# Patient Record
Sex: FEMALE | Race: OTHER | NOT HISPANIC OR LATINO
[De-identification: names, ages, dates, MRNs, and addresses within clinical notes are randomized per-mention and may not be internally consistent; named-entity substitution may affect disease eponyms.]

---

## 2017-01-12 ENCOUNTER — APPOINTMENT (OUTPATIENT)
Dept: UROLOGY | Facility: CLINIC | Age: 32
End: 2017-01-12

## 2017-01-12 VITALS
BODY MASS INDEX: 27.23 KG/M2 | SYSTOLIC BLOOD PRESSURE: 120 MMHG | HEART RATE: 65 BPM | HEIGHT: 62 IN | DIASTOLIC BLOOD PRESSURE: 70 MMHG | WEIGHT: 148 LBS | OXYGEN SATURATION: 99 %

## 2017-01-16 ENCOUNTER — TRANSCRIPTION ENCOUNTER (OUTPATIENT)
Age: 32
End: 2017-01-16

## 2017-03-23 ENCOUNTER — APPOINTMENT (OUTPATIENT)
Dept: INTERNAL MEDICINE | Facility: CLINIC | Age: 32
End: 2017-03-23

## 2017-03-23 ENCOUNTER — OUTPATIENT (OUTPATIENT)
Dept: OUTPATIENT SERVICES | Facility: HOSPITAL | Age: 32
LOS: 1 days | End: 2017-03-23
Payer: COMMERCIAL

## 2017-03-23 VITALS
DIASTOLIC BLOOD PRESSURE: 75 MMHG | HEART RATE: 66 BPM | BODY MASS INDEX: 27.6 KG/M2 | HEIGHT: 62 IN | WEIGHT: 150 LBS | RESPIRATION RATE: 16 BRPM | SYSTOLIC BLOOD PRESSURE: 114 MMHG | TEMPERATURE: 97.9 F

## 2017-03-23 DIAGNOSIS — N28.1 CYST OF KIDNEY, ACQUIRED: ICD-10-CM

## 2017-03-23 DIAGNOSIS — Z82.3 FAMILY HISTORY OF STROKE: ICD-10-CM

## 2017-03-23 DIAGNOSIS — Z91.09 OTHER ALLERGY STATUS, OTHER THAN TO DRUGS AND BIOLOGICAL SUBSTANCES: ICD-10-CM

## 2017-03-23 DIAGNOSIS — Z80.1 FAMILY HISTORY OF MALIGNANT NEOPLASM OF TRACHEA, BRONCHUS AND LUNG: ICD-10-CM

## 2017-03-23 DIAGNOSIS — Z00.00 ENCOUNTER FOR GENERAL ADULT MEDICAL EXAMINATION WITHOUT ABNORMAL FINDINGS: ICD-10-CM

## 2017-03-23 DIAGNOSIS — R31.9 HEMATURIA, UNSPECIFIED: ICD-10-CM

## 2017-03-23 DIAGNOSIS — R80.9 PROTEINURIA, UNSPECIFIED: ICD-10-CM

## 2017-03-23 LAB
APPEARANCE UR: ABNORMAL
BACTERIA # UR AUTO: ABNORMAL
BILIRUB UR-MCNC: NEGATIVE — SIGNIFICANT CHANGE UP
COLOR SPEC: YELLOW — SIGNIFICANT CHANGE UP
DIFF PNL FLD: ABNORMAL
EPI CELLS # UR: 18 /HPF — HIGH (ref 0–5)
GLUCOSE UR QL: NEGATIVE MG/DL — SIGNIFICANT CHANGE UP
HYALINE CASTS # UR AUTO: 6 /LPF — SIGNIFICANT CHANGE UP (ref 0–7)
KETONES UR-MCNC: NEGATIVE — SIGNIFICANT CHANGE UP
LEUKOCYTE ESTERASE UR-ACNC: NEGATIVE — SIGNIFICANT CHANGE UP
NITRITE UR-MCNC: NEGATIVE — SIGNIFICANT CHANGE UP
PH UR: 6 — SIGNIFICANT CHANGE UP (ref 5–8)
PROT UR-MCNC: 300 MG/DL
RBC CASTS # UR COMP ASSIST: 60 /HPF — HIGH (ref 0–4)
SP GR SPEC: 1.02 — SIGNIFICANT CHANGE UP (ref 1.01–1.02)
UROBILINOGEN FLD QL: NEGATIVE MG/DL — SIGNIFICANT CHANGE UP
WBC UR QL: 5 /HPF — SIGNIFICANT CHANGE UP (ref 0–5)

## 2017-03-23 PROCEDURE — 80053 COMPREHEN METABOLIC PANEL: CPT

## 2017-03-23 PROCEDURE — 85027 COMPLETE CBC AUTOMATED: CPT

## 2017-03-23 PROCEDURE — 83036 HEMOGLOBIN GLYCOSYLATED A1C: CPT

## 2017-03-23 PROCEDURE — 82785 ASSAY OF IGE: CPT

## 2017-03-23 PROCEDURE — G0463: CPT

## 2017-03-23 PROCEDURE — 82306 VITAMIN D 25 HYDROXY: CPT

## 2017-03-23 PROCEDURE — 81001 URINALYSIS AUTO W/SCOPE: CPT

## 2017-03-23 PROCEDURE — 82043 UR ALBUMIN QUANTITATIVE: CPT

## 2017-03-23 PROCEDURE — 80061 LIPID PANEL: CPT

## 2017-03-23 PROCEDURE — 86003 ALLG SPEC IGE CRUDE XTRC EA: CPT

## 2017-03-24 LAB
24R-OH-CALCIDIOL SERPL-MCNC: 13.3 NG/ML — LOW (ref 30–100)
ALBUMIN SERPL ELPH-MCNC: 4.4 G/DL — SIGNIFICANT CHANGE UP (ref 3.3–5)
ALP SERPL-CCNC: 62 U/L — SIGNIFICANT CHANGE UP (ref 40–120)
ALT FLD-CCNC: 22 U/L — SIGNIFICANT CHANGE UP (ref 10–45)
ANION GAP SERPL CALC-SCNC: 15 MMOL/L — SIGNIFICANT CHANGE UP (ref 5–17)
ANISOCYTOSIS BLD QL: SIGNIFICANT CHANGE UP
AST SERPL-CCNC: 27 U/L — SIGNIFICANT CHANGE UP (ref 10–40)
BASOPHILS # BLD AUTO: 0.05 K/UL — SIGNIFICANT CHANGE UP (ref 0–0.2)
BASOPHILS NFR BLD AUTO: 0.5 % — SIGNIFICANT CHANGE UP (ref 0–2)
BILIRUB SERPL-MCNC: 0.8 MG/DL — SIGNIFICANT CHANGE UP (ref 0.2–1.2)
BUN SERPL-MCNC: 18 MG/DL — SIGNIFICANT CHANGE UP (ref 7–23)
CALCIUM SERPL-MCNC: 9.7 MG/DL — SIGNIFICANT CHANGE UP (ref 8.4–10.5)
CHLORIDE SERPL-SCNC: 104 MMOL/L — SIGNIFICANT CHANGE UP (ref 96–108)
CHOLEST SERPL-MCNC: 175 MG/DL — SIGNIFICANT CHANGE UP (ref 10–199)
CO2 SERPL-SCNC: 24 MMOL/L — SIGNIFICANT CHANGE UP (ref 22–31)
CREAT ?TM UR-MCNC: 152 MG/DL — SIGNIFICANT CHANGE UP
CREAT SERPL-MCNC: 0.86 MG/DL — SIGNIFICANT CHANGE UP (ref 0.5–1.3)
ELLIPTOCYTES BLD QL SMEAR: SLIGHT — SIGNIFICANT CHANGE UP
EOSINOPHIL # BLD AUTO: 0.17 K/UL — SIGNIFICANT CHANGE UP (ref 0–0.5)
EOSINOPHIL NFR BLD AUTO: 1.8 % — SIGNIFICANT CHANGE UP (ref 0–6)
GLUCOSE SERPL-MCNC: 90 MG/DL — SIGNIFICANT CHANGE UP (ref 70–99)
HBA1C BLD-MCNC: 5.3 % — SIGNIFICANT CHANGE UP (ref 4–5.6)
HCT VFR BLD CALC: 42.7 % — SIGNIFICANT CHANGE UP (ref 34.5–45)
HDLC SERPL-MCNC: 52 MG/DL — SIGNIFICANT CHANGE UP (ref 40–125)
HGB BLD-MCNC: 13.4 G/DL — SIGNIFICANT CHANGE UP (ref 11.5–15.5)
HYPOCHROMIA BLD QL: SLIGHT — SIGNIFICANT CHANGE UP
IMM GRANULOCYTES NFR BLD AUTO: 0.3 % — SIGNIFICANT CHANGE UP (ref 0–1.5)
LIPID PNL WITH DIRECT LDL SERPL: 98 MG/DL — SIGNIFICANT CHANGE UP
LYMPHOCYTES # BLD AUTO: 3.17 K/UL — SIGNIFICANT CHANGE UP (ref 1–3.3)
LYMPHOCYTES # BLD AUTO: 33.2 % — SIGNIFICANT CHANGE UP (ref 13–44)
MANUAL SMEAR VERIFICATION: SIGNIFICANT CHANGE UP
MCHC RBC-ENTMCNC: 19.6 PG — LOW (ref 27–34)
MCHC RBC-ENTMCNC: 31.4 GM/DL — LOW (ref 32–36)
MCV RBC AUTO: 62.6 FL — LOW (ref 80–100)
MICROALBUMIN UR-MCNC: 181.2 MG/DL — SIGNIFICANT CHANGE UP
MICROALBUMIN/CREAT UR-RTO: 1192 UG/MG — HIGH (ref 0–30)
MICROCYTES BLD QL: SLIGHT — SIGNIFICANT CHANGE UP
MONOCYTES # BLD AUTO: 0.55 K/UL — SIGNIFICANT CHANGE UP (ref 0–0.9)
MONOCYTES NFR BLD AUTO: 5.8 % — SIGNIFICANT CHANGE UP (ref 2–14)
NEUTROPHILS # BLD AUTO: 5.59 K/UL — SIGNIFICANT CHANGE UP (ref 1.8–7.4)
NEUTROPHILS NFR BLD AUTO: 58.4 % — SIGNIFICANT CHANGE UP (ref 43–77)
OVALOCYTES BLD QL SMEAR: SLIGHT — SIGNIFICANT CHANGE UP
PLAT MORPH BLD: NORMAL — SIGNIFICANT CHANGE UP
PLATELET # BLD AUTO: 283 K/UL — SIGNIFICANT CHANGE UP (ref 150–400)
POIKILOCYTOSIS BLD QL AUTO: SLIGHT — SIGNIFICANT CHANGE UP
POLYCHROMASIA BLD QL SMEAR: SLIGHT — SIGNIFICANT CHANGE UP
POTASSIUM SERPL-MCNC: 4.6 MMOL/L — SIGNIFICANT CHANGE UP (ref 3.5–5.3)
POTASSIUM SERPL-SCNC: 4.6 MMOL/L — SIGNIFICANT CHANGE UP (ref 3.5–5.3)
PROT SERPL-MCNC: 7.9 G/DL — SIGNIFICANT CHANGE UP (ref 6–8.3)
RBC # BLD: 6.82 M/UL — HIGH (ref 3.8–5.2)
RBC # FLD: 16.1 % — HIGH (ref 10.3–14.5)
RBC BLD AUTO: ABNORMAL
SODIUM SERPL-SCNC: 143 MMOL/L — SIGNIFICANT CHANGE UP (ref 135–145)
TOTAL CHOLESTEROL/HDL RATIO MEASUREMENT: 3.4 RATIO — SIGNIFICANT CHANGE UP (ref 3.3–7.1)
TRIGL SERPL-MCNC: 125 MG/DL — SIGNIFICANT CHANGE UP (ref 10–149)
WBC # BLD: 9.56 K/UL — SIGNIFICANT CHANGE UP (ref 3.8–10.5)
WBC # FLD AUTO: 9.56 K/UL — SIGNIFICANT CHANGE UP (ref 3.8–10.5)

## 2017-03-25 LAB
ALLERGEN - JUNIPER (RED CEDAR) CLASS: 0 — SIGNIFICANT CHANGE UP
ALLERGEN - JUNIPER (RED CEDAR) CONC: <0.1 KUA/L — SIGNIFICANT CHANGE UP
ALLERGEN - ML SYCAMORE, LONDON PLANE: 1.26 KUA/L — HIGH
ALLERGEN - MULBERRY: <0.1 KUA/L — SIGNIFICANT CHANGE UP
BERMUDA GRASS IGE QN: 0.14 KUA/L — SIGNIFICANT CHANGE UP
BERMUDA GRASS IGE QN: SIGNIFICANT CHANGE UP
BOXELDER/MAPLE CLASS: 0 — SIGNIFICANT CHANGE UP
CALIF WALNUT IGE QN: <0.1 KUA/L — SIGNIFICANT CHANGE UP
CAT DANDER IGE QN: 2.15 KUA/L — HIGH
CLADOSPORIUM IGE QN: 0 — SIGNIFICANT CHANGE UP
CLADOSPORIUM IGE QN: <0.1 KUA/L — SIGNIFICANT CHANGE UP
CMN PIGWEED IGE QN: <0.1 KUA/L — SIGNIFICANT CHANGE UP
COMMON RAGWEED IGE QN: <0.1 KUA/L — SIGNIFICANT CHANGE UP
COTTONWOOD IGE QN: <0.1 KUA/L — SIGNIFICANT CHANGE UP
D PTERONYSS IGE QN: 46.6 KUA/L — HIGH
DEPRECATED A ALTERNATA IGE RAST QL: <0.1 KUA/L — SIGNIFICANT CHANGE UP
DEPRECATED A FUMIGATUS IGE RAST QL: 0 — SIGNIFICANT CHANGE UP
DEPRECATED ALTERNARIA IGE RAST QL: 0 — SIGNIFICANT CHANGE UP
DEPRECATED CALIF WALNUT POLN IGE RAST QL: 0 — SIGNIFICANT CHANGE UP
DEPRECATED CAT DANDER IGE RAST QL: ABNORMAL
DEPRECATED COMMON PIGWEED IGE RAST QL: 0 — SIGNIFICANT CHANGE UP
DEPRECATED COMMON RAGWEED IGE RAST QL: 0 — SIGNIFICANT CHANGE UP
DEPRECATED COTTONWOOD IGE RAST QL: 0 — SIGNIFICANT CHANGE UP
DEPRECATED LONDON PLANE IGE RAST QL: ABNORMAL
DEPRECATED MUGWORT IGE RAST QL: 0 — SIGNIFICANT CHANGE UP
DEPRECATED P NOTATUM IGE RAST QL: 0 — SIGNIFICANT CHANGE UP
DEPRECATED ROACH IGE RAST QL: SIGNIFICANT CHANGE UP
DEPRECATED SHEEP SORREL IGE RAST QL: 0 — SIGNIFICANT CHANGE UP
DEPRECATED TIMOTHY IGE RAST QL: 0 — SIGNIFICANT CHANGE UP
DEPRECATED WHITE ASH IGE RAST QL: 0 — SIGNIFICANT CHANGE UP
DOG DANDER IGE QN: 0.94 KUA/L — HIGH
DOG DANDER IGE QN: ABNORMAL
DUST ALLERG MIX2 IGE RAST: ABNORMAL
GOOSEFOOT IGE QN: 0.15 KUA/L — SIGNIFICANT CHANGE UP
MOLD ALLERG MIX A IGE QL: <0.1 KUA/L — SIGNIFICANT CHANGE UP
MUGWORT IGE QN: <0.1 KUA/L — SIGNIFICANT CHANGE UP
MULBERRY CLASS: 0 — SIGNIFICANT CHANGE UP
P NOTATUM IGE QN: <0.1 KUA/L — SIGNIFICANT CHANGE UP
ROACH IGE QN: 0.27 KUA/L — SIGNIFICANT CHANGE UP
SHEEP SORREL IGE QN: <0.1 KUA/L — SIGNIFICANT CHANGE UP
SILVER BIRCH IGE QN: 0 — SIGNIFICANT CHANGE UP
SILVER BIRCH IGE QN: <0.1 KUA/L — SIGNIFICANT CHANGE UP
TIMOTHY IGE QN: <0.1 KUA/L — SIGNIFICANT CHANGE UP
TOTAL IGE SMQN RAST: 521 KU/L — HIGH
TOTAL IGE SMQN RAST: SIGNIFICANT CHANGE UP
TREE ALLERG MIX1 IGE QL: <0.1 KUA/L — SIGNIFICANT CHANGE UP
TREE ALLERG MIX1 IGE QN: <0.1 KUA/L — SIGNIFICANT CHANGE UP
TREE ALLERG MIX1 IGE RAST: 0 — SIGNIFICANT CHANGE UP
TREE ALLERG MIX1 IGE RAST: 0 — SIGNIFICANT CHANGE UP
TREE ALLERG MIX8 IGE QL: <0.1 KUA/L — SIGNIFICANT CHANGE UP
WEED ALLERG MIX1 IGE RAST: SIGNIFICANT CHANGE UP
WHITE ASH IGE QN: <0.1 KUA/L — SIGNIFICANT CHANGE UP

## 2017-03-29 LAB
D FARINAE IGE QN: 43 KUA/L — HIGH
DEPRECATED D FARINAE IGE RAST QL: ABNORMAL

## 2017-04-06 ENCOUNTER — APPOINTMENT (OUTPATIENT)
Dept: OPHTHALMOLOGY | Facility: CLINIC | Age: 32
End: 2017-04-06

## 2017-04-06 DIAGNOSIS — H40.003 PREGLAUCOMA, UNSPECIFIED, BILATERAL: ICD-10-CM

## 2017-08-04 ENCOUNTER — TRANSCRIPTION ENCOUNTER (OUTPATIENT)
Age: 32
End: 2017-08-04

## 2017-08-06 ENCOUNTER — TRANSCRIPTION ENCOUNTER (OUTPATIENT)
Age: 32
End: 2017-08-06

## 2018-04-04 ENCOUNTER — LABORATORY RESULT (OUTPATIENT)
Age: 33
End: 2018-04-04

## 2018-04-04 ENCOUNTER — APPOINTMENT (OUTPATIENT)
Dept: INTERNAL MEDICINE | Facility: CLINIC | Age: 33
End: 2018-04-04
Payer: COMMERCIAL

## 2018-04-04 VITALS
DIASTOLIC BLOOD PRESSURE: 78 MMHG | WEIGHT: 150 LBS | RESPIRATION RATE: 16 BRPM | SYSTOLIC BLOOD PRESSURE: 122 MMHG | TEMPERATURE: 98.3 F | HEIGHT: 62 IN | HEART RATE: 71 BPM | OXYGEN SATURATION: 98 % | BODY MASS INDEX: 27.6 KG/M2

## 2018-04-04 DIAGNOSIS — Z91.09 OTHER ALLERGY STATUS, OTHER THAN TO DRUGS AND BIOLOGICAL SUBSTANCES: ICD-10-CM

## 2018-04-04 DIAGNOSIS — R93.429 ABNORMAL RADIOLOGIC FINDINGS ON DIAGNOSITIC IMAGING OF UNSPECIFIED KIDNEY: ICD-10-CM

## 2018-04-04 PROCEDURE — 99395 PREV VISIT EST AGE 18-39: CPT

## 2018-04-05 LAB
ALBUMIN SERPL ELPH-MCNC: 4 G/DL
ALP BLD-CCNC: 66 U/L
ALT SERPL-CCNC: 19 U/L
ANION GAP SERPL CALC-SCNC: 12 MMOL/L
APPEARANCE: CLEAR
AST SERPL-CCNC: 17 U/L
BACTERIA: ABNORMAL
BASOPHILS # BLD AUTO: 0.03 K/UL
BASOPHILS NFR BLD AUTO: 0.3 %
BILIRUB SERPL-MCNC: 0.7 MG/DL
BILIRUBIN URINE: NEGATIVE
BLOOD URINE: ABNORMAL
BUN SERPL-MCNC: 18 MG/DL
CALCIUM SERPL-MCNC: 9.1 MG/DL
CHLORIDE SERPL-SCNC: 108 MMOL/L
CHOLEST SERPL-MCNC: 160 MG/DL
CHOLEST/HDLC SERPL: 3.6 RATIO
CO2 SERPL-SCNC: 23 MMOL/L
COLOR: YELLOW
CREAT SERPL-MCNC: 0.97 MG/DL
EOSINOPHIL # BLD AUTO: 0.16 K/UL
EOSINOPHIL NFR BLD AUTO: 1.8 %
ESTIMATED AVERAGE GLUCOSE: 103 MG/DL
FERRITIN SERPL-MCNC: 189 NG/ML
GLUCOSE QUALITATIVE U: NEGATIVE MG/DL
GLUCOSE SERPL-MCNC: 94 MG/DL
HBA1C MFR BLD HPLC: 5.2 %
HCT VFR BLD CALC: 38.8 %
HDLC SERPL-MCNC: 45 MG/DL
HGB BLD-MCNC: 12.8 G/DL
HYALINE CASTS: 1 /LPF
IMM GRANULOCYTES NFR BLD AUTO: 0.2 %
KETONES URINE: NEGATIVE
LDLC SERPL CALC-MCNC: 98 MG/DL
LEUKOCYTE ESTERASE URINE: NEGATIVE
LYMPHOCYTES # BLD AUTO: 2.72 K/UL
LYMPHOCYTES NFR BLD AUTO: 31.4 %
MAN DIFF?: NORMAL
MCHC RBC-ENTMCNC: 20.3 PG
MCHC RBC-ENTMCNC: 33 GM/DL
MCV RBC AUTO: 61.7 FL
MICROSCOPIC-UA: NORMAL
MONOCYTES # BLD AUTO: 0.6 K/UL
MONOCYTES NFR BLD AUTO: 6.9 %
NEUTROPHILS # BLD AUTO: 5.14 K/UL
NEUTROPHILS NFR BLD AUTO: 59.4 %
NITRITE URINE: NEGATIVE
PH URINE: 6
PLATELET # BLD AUTO: 323 K/UL
POTASSIUM SERPL-SCNC: 4.7 MMOL/L
PROT SERPL-MCNC: 6.8 G/DL
PROTEIN URINE: 100 MG/DL
RBC # BLD: 6.29 M/UL
RBC # FLD: 16.2 %
RED BLOOD CELLS URINE: 26 /HPF
SODIUM SERPL-SCNC: 143 MMOL/L
SPECIFIC GRAVITY URINE: 1.02
SQUAMOUS EPITHELIAL CELLS: 6 /HPF
TRIGL SERPL-MCNC: 85 MG/DL
TSH SERPL-ACNC: 3.01 UIU/ML
UROBILINOGEN URINE: NEGATIVE MG/DL
WBC # FLD AUTO: 8.67 K/UL
WHITE BLOOD CELLS URINE: 4 /HPF

## 2018-04-06 LAB
HGB A MFR BLD: 92.7 %
HGB A2 MFR BLD: 5.4 %
HGB F MFR BLD: 1.9 %
HGB FRACT BLD-IMP: NORMAL

## 2018-06-28 ENCOUNTER — RESULT REVIEW (OUTPATIENT)
Age: 33
End: 2018-06-28

## 2018-08-20 ENCOUNTER — APPOINTMENT (OUTPATIENT)
Dept: INTERNAL MEDICINE | Facility: CLINIC | Age: 33
End: 2018-08-20
Payer: COMMERCIAL

## 2018-08-20 ENCOUNTER — LABORATORY RESULT (OUTPATIENT)
Age: 33
End: 2018-08-20

## 2018-08-20 VITALS
OXYGEN SATURATION: 100 % | WEIGHT: 155 LBS | SYSTOLIC BLOOD PRESSURE: 114 MMHG | HEART RATE: 76 BPM | BODY MASS INDEX: 28.52 KG/M2 | DIASTOLIC BLOOD PRESSURE: 60 MMHG | HEIGHT: 62 IN | RESPIRATION RATE: 16 BRPM | TEMPERATURE: 98.5 F

## 2018-08-20 PROCEDURE — 99214 OFFICE O/P EST MOD 30 MIN: CPT

## 2018-08-20 NOTE — HISTORY OF PRESENT ILLNESS
[FreeTextEntry1] : RUPALI CULLEN is a 32 year-old female who presents for follow-up hypothyroidism during pregnancy\par  [de-identified] : 32F with TSH not at goal (2.5) during pregnancy.  Her Ob/Gyn started her on Levothyroxine 50 mcg daily approx 6 weeks ago after TSH screening.  She is 12 weeks pregnant.  She reports feeling tired, denies cold intolerance,constipation, dry skin, myalgia.  She denies chest pain, SOB, dizziness, weakness, palpitations, lightheadedness or syncope.  Denies FHx thyroid disorder. \par \par LABS REVIEWED \par TSH 6/28/18 = 4.96  --> 4.50 (7/12/18) --> started Levothyroxine 50 mcg daily\par \par ROS - Constitutional, Eyes, HEENT, Cardiovascular, Pulmonary, Gastrointestinal, Genitourinary, Musculoskeletal, Integumentary, Psych, Heme/Lymph, Endocrine are otherwise negative except as noted in HPI.\par \par GENERAL:  Well appearing, well nourished\par EYES:  Normal Conjunctiva\par EARS: normal hearing\par NOSE:  Normal mucosa, no discharge\par NECK: supple, no masses, thyroid not enlarged\par LUNGS: Clear to auscultation\par CV:  S1, S2 normal, regular rate and rhythm, no bilateral lower extremity edema\par ABD:  Soft, flat, non-tender, bowel sounds normoactive\par EXT: normal peripheral pulses, FROM without pain\par Neuro:  no focal deficits\par PSYCH: Oriented x 3, normal affect, mood

## 2018-08-21 LAB — TSH SERPL-ACNC: 0.99 UIU/ML

## 2018-09-24 ENCOUNTER — TRANSCRIPTION ENCOUNTER (OUTPATIENT)
Age: 33
End: 2018-09-24

## 2018-09-25 ENCOUNTER — MOBILE ON CALL (OUTPATIENT)
Age: 33
End: 2018-09-25

## 2018-09-25 ENCOUNTER — TRANSCRIPTION ENCOUNTER (OUTPATIENT)
Age: 33
End: 2018-09-25

## 2018-09-26 LAB
T4 FREE SERPL-MCNC: 1.1 NG/DL
TSH SERPL-ACNC: 1.4 UIU/ML

## 2018-10-17 ENCOUNTER — ASOB RESULT (OUTPATIENT)
Age: 33
End: 2018-10-17

## 2018-10-17 ENCOUNTER — APPOINTMENT (OUTPATIENT)
Dept: ANTEPARTUM | Facility: CLINIC | Age: 33
End: 2018-10-17
Payer: COMMERCIAL

## 2018-10-17 PROCEDURE — 76817 TRANSVAGINAL US OBSTETRIC: CPT

## 2018-10-17 PROCEDURE — 76811 OB US DETAILED SNGL FETUS: CPT

## 2018-10-24 ENCOUNTER — ASOB RESULT (OUTPATIENT)
Age: 33
End: 2018-10-24

## 2018-10-24 ENCOUNTER — APPOINTMENT (OUTPATIENT)
Dept: ANTEPARTUM | Facility: CLINIC | Age: 33
End: 2018-10-24
Payer: COMMERCIAL

## 2018-10-24 PROCEDURE — 76816 OB US FOLLOW-UP PER FETUS: CPT

## 2018-10-24 PROCEDURE — 76817 TRANSVAGINAL US OBSTETRIC: CPT

## 2018-10-31 ENCOUNTER — APPOINTMENT (OUTPATIENT)
Dept: ANTEPARTUM | Facility: CLINIC | Age: 33
End: 2018-10-31
Payer: COMMERCIAL

## 2018-10-31 ENCOUNTER — ASOB RESULT (OUTPATIENT)
Age: 33
End: 2018-10-31

## 2018-10-31 PROCEDURE — 76815 OB US LIMITED FETUS(S): CPT

## 2018-10-31 PROCEDURE — 76817 TRANSVAGINAL US OBSTETRIC: CPT

## 2018-11-13 ENCOUNTER — ASOB RESULT (OUTPATIENT)
Age: 33
End: 2018-11-13

## 2018-11-13 ENCOUNTER — APPOINTMENT (OUTPATIENT)
Dept: ANTEPARTUM | Facility: CLINIC | Age: 33
End: 2018-11-13
Payer: COMMERCIAL

## 2018-11-13 PROCEDURE — 76817 TRANSVAGINAL US OBSTETRIC: CPT

## 2018-11-13 PROCEDURE — 76816 OB US FOLLOW-UP PER FETUS: CPT

## 2019-02-12 ENCOUNTER — LABORATORY RESULT (OUTPATIENT)
Age: 34
End: 2019-02-12

## 2019-02-12 ENCOUNTER — APPOINTMENT (OUTPATIENT)
Dept: NEPHROLOGY | Facility: CLINIC | Age: 34
End: 2019-02-12
Payer: COMMERCIAL

## 2019-02-12 VITALS
BODY MASS INDEX: 30.83 KG/M2 | OXYGEN SATURATION: 98 % | HEIGHT: 62 IN | WEIGHT: 167.55 LBS | SYSTOLIC BLOOD PRESSURE: 127 MMHG | DIASTOLIC BLOOD PRESSURE: 67 MMHG | HEART RATE: 79 BPM

## 2019-02-12 VITALS — SYSTOLIC BLOOD PRESSURE: 118 MMHG | DIASTOLIC BLOOD PRESSURE: 80 MMHG

## 2019-02-12 LAB
APPEARANCE: ABNORMAL
BACTERIA: ABNORMAL
BILIRUBIN URINE: NEGATIVE
BLOOD URINE: ABNORMAL
COLOR: YELLOW
GLUCOSE QUALITATIVE U: NEGATIVE MG/DL
KETONES URINE: NEGATIVE
LEUKOCYTE ESTERASE URINE: ABNORMAL
MICROSCOPIC-UA: NORMAL
NITRITE URINE: NEGATIVE
PH URINE: 6.5
PROTEIN URINE: 100 MG/DL
RED BLOOD CELLS URINE: 8 /HPF
SPECIFIC GRAVITY URINE: 1.01
SQUAMOUS EPITHELIAL CELLS: 5 /HPF
UROBILINOGEN URINE: NEGATIVE MG/DL
WHITE BLOOD CELLS URINE: 58 /HPF

## 2019-02-12 PROCEDURE — 99214 OFFICE O/P EST MOD 30 MIN: CPT

## 2019-02-12 RX ORDER — PRENATAL WITH FERROUS FUM AND FOLIC ACID 3080; 920; 120; 400; 22; 1.84; 3; 20; 10; 1; 12; 200; 27; 25; 2 [IU]/1; [IU]/1; MG/1; [IU]/1; MG/1; MG/1; MG/1; MG/1; MG/1; MG/1; UG/1; MG/1; MG/1; MG/1; MG/1
27-1 TABLET ORAL
Refills: 0 | Status: ACTIVE | COMMUNITY
Start: 2019-02-12

## 2019-02-14 NOTE — PHYSICAL EXAM
[General Appearance - Alert] : alert [General Appearance - In No Acute Distress] : in no acute distress [General Appearance - Well Nourished] : well nourished [General Appearance - Well Developed] : well developed [Sclera] : the sclera and conjunctiva were normal [Outer Ear] : the ears and nose were normal in appearance [Neck Appearance] : the appearance of the neck was normal [Neck Cervical Mass (___cm)] : no neck mass was observed [Jugular Venous Distention Increased] : there was no jugular-venous distention [Respiration, Rhythm And Depth] : normal respiratory rhythm and effort [Auscultation Breath Sounds / Voice Sounds] : lungs were clear to auscultation bilaterally [Apical Impulse] : the apical impulse was normal [Heart Rate And Rhythm] : heart rate was normal and rhythm regular [Heart Sounds] : normal S1 and S2 [Heart Sounds Gallop] : no gallops [Murmurs] : no murmurs [Edema] : there was no peripheral edema [Bowel Sounds] : normal bowel sounds [Abdomen Soft] : soft [Abdomen Tenderness] : non-tender [No CVA Tenderness] : no ~M costovertebral angle tenderness [Abnormal Walk] : normal gait [Musculoskeletal - Swelling] : no joint swelling seen [Skin Color & Pigmentation] : normal skin color and pigmentation [Skin Turgor] : normal skin turgor [] : no rash [Cranial Nerves] : cranial nerves 2-12 were intact [Oriented To Time, Place, And Person] : oriented to person, place, and time [Impaired Insight] : insight and judgment were intact [Affect] : the affect was normal

## 2019-02-15 LAB
ALBUMIN SERPL ELPH-MCNC: 3.9 G/DL
ALBUMIN SERPL ELPH-MCNC: 3.9 G/DL
ALP BLD-CCNC: 100 U/L
ALT SERPL-CCNC: 12 U/L
ANA SER IF-ACNC: NEGATIVE
ANION GAP SERPL CALC-SCNC: 14 MMOL/L
AST SERPL-CCNC: 24 U/L
BASOPHILS # BLD AUTO: 0 K/UL
BASOPHILS NFR BLD AUTO: 0 %
BILIRUB DIRECT SERPL-MCNC: 0.1 MG/DL
BILIRUB INDIRECT SERPL-MCNC: 0.3 MG/DL
BILIRUB SERPL-MCNC: 0.4 MG/DL
BUN SERPL-MCNC: 13 MG/DL
C3 SERPL-MCNC: 155 MG/DL
C4 SERPL-MCNC: 38 MG/DL
CALCIUM SERPL-MCNC: 9.7 MG/DL
CHLORIDE SERPL-SCNC: 102 MMOL/L
CO2 SERPL-SCNC: 20 MMOL/L
CREAT SERPL-MCNC: 0.74 MG/DL
CREAT SPEC-SCNC: 33 MG/DL
CREAT/PROT UR: 3.1 RATIO
DEPRECATED KAPPA LC FREE/LAMBDA SER: 0.71 RATIO
DSDNA AB SER-ACNC: <12 IU/ML
EOSINOPHIL # BLD AUTO: 0 K/UL
EOSINOPHIL NFR BLD AUTO: 0 %
FERRITIN SERPL-MCNC: 159 NG/ML
GLUCOSE SERPL-MCNC: 60 MG/DL
HAPTOGLOB SERPL-MCNC: 38 MG/DL
HCT VFR BLD CALC: 38.2 %
HGB BLD-MCNC: 11.8 G/DL
IRON SATN MFR SERPL: 26 %
IRON SERPL-MCNC: 108 UG/DL
KAPPA LC CSF-MCNC: 2.3 MG/DL
KAPPA LC SERPL-MCNC: 1.64 MG/DL
LDH SERPL-CCNC: 270 U/L
LYMPHOCYTES # BLD AUTO: 1.37 K/UL
LYMPHOCYTES NFR BLD AUTO: 12.4 %
MAGNESIUM SERPL-MCNC: 1.9 MG/DL
MAN DIFF?: NORMAL
MCHC RBC-ENTMCNC: 20.9 PG
MCHC RBC-ENTMCNC: 30.9 GM/DL
MCV RBC AUTO: 67.7 FL
MONOCYTES # BLD AUTO: 1.17 K/UL
MONOCYTES NFR BLD AUTO: 10.6 %
NEUTROPHILS # BLD AUTO: 8.28 K/UL
NEUTROPHILS NFR BLD AUTO: 74.3 %
PHOSPHATE SERPL-MCNC: 3 MG/DL
PLATELET # BLD AUTO: 335 K/UL
POTASSIUM SERPL-SCNC: 4.1 MMOL/L
PROT SERPL-MCNC: 7.2 G/DL
PROT UR-MCNC: 102 MG/DL
RBC # BLD: 5.64 M/UL
RBC # FLD: 16 %
RPR SER-TITR: NORMAL
SODIUM SERPL-SCNC: 136 MMOL/L
TIBC SERPL-MCNC: 416 UG/DL
UIBC SERPL-MCNC: 308 UG/DL
URATE SERPL-MCNC: 7 MG/DL
WBC # FLD AUTO: 11.01 K/UL

## 2019-02-15 NOTE — ASSESSMENT
[FreeTextEntry1] : 37 weeks pregnant\par \par #likely underlying ckd/ GNhematuria/proteinuria\par -proteinuria 1/23/19 on a spot was 31.\par -24 hr proteinuria 1/27/19 2.2g, cr 0.85\par -spot prot/cr 2/19 3.1\par -proteinuria seems stable\par -monitor for now\par -d/w pt that I think this proteinuria is likley related to underlying kidney issue and not PEC but we have to monitor her BP, and monitor for PEC closely\par -may need renal biopsy post delivery\par -plan for sono\par -serology sent for autoimmune negative thus far; viral at ob negative\par \par #two complex cysts LUP 4x3.6cm and LLP 1.4cm; \par -repeat renal sono\par \par #Bp stable, normal at office visit- discussed with pt signs/symptoms of PEC/what to look out for; check PEc labs today\par #advised to check urine cx at OB office\par \par #cr elevated for pregnancy; 1/26/19 0.85; however now improved; monitor; prepregnancy creatinint 4/4/18 0.97

## 2019-02-15 NOTE — HISTORY OF PRESENT ILLNESS
[FreeTextEntry1] : H/o cysts in kidney -complex cysts, hematuria, proteinuria, seen by urology Jan 2017 for the complex cysts and was told to follow up cysts\par Followed by Nephrology in the past  when she was 18 years and had renal biopsy which she reports as normal, but can not obtain Biopsy results from [facility]. Subsequently she was followed by Dr Zamora September 2016. \par This is her first pregnancy.\par Denies any miscarriages or abortions\par LMP was May 2018; April labs cr 0.97, 100 prot on UA\par GEOVANY March 1st\par 37 weeks pregnant, followed by Dr Skye Condon [place]; sees her every week; will deliver at [facility]\par Around 35 weeks patient reports higher BP than usual in the offices at around 130s on an automatic cuff, and increased proteinuria\par States at work she checks BP and BP is 114s on a manual cuff\par jan 26 2019 cr 0.85\par 1/27 hb 10.8, plat 234\par 24hr 1/27 2.2g\par denies leg swelling, foamy urine, or gross hematuria\par all other ros negative\par \par \par

## 2019-02-18 ENCOUNTER — FORM ENCOUNTER (OUTPATIENT)
Age: 34
End: 2019-02-18

## 2019-02-19 ENCOUNTER — OUTPATIENT (OUTPATIENT)
Dept: OUTPATIENT SERVICES | Facility: HOSPITAL | Age: 34
LOS: 1 days | End: 2019-02-19
Payer: COMMERCIAL

## 2019-02-19 ENCOUNTER — APPOINTMENT (OUTPATIENT)
Dept: ULTRASOUND IMAGING | Facility: CLINIC | Age: 34
End: 2019-02-19
Payer: COMMERCIAL

## 2019-02-19 DIAGNOSIS — Z00.8 ENCOUNTER FOR OTHER GENERAL EXAMINATION: ICD-10-CM

## 2019-02-19 PROCEDURE — 76775 US EXAM ABDO BACK WALL LIM: CPT

## 2019-02-19 PROCEDURE — 76775 US EXAM ABDO BACK WALL LIM: CPT | Mod: 26

## 2019-02-23 LAB
BSA DERIVED: 1.73 M2
CREAT 24H UR-MCNC: 1.4 G/24 H

## 2019-02-26 LAB
ALBUMIN MFR SERPL ELPH: 53.3 %
ALBUMIN SERPL-MCNC: 3.8 G/DL
ALBUMIN/GLOB SERPL: 1.1 RATIO
ALPHA1 GLOB MFR SERPL ELPH: 7 %
ALPHA1 GLOB SERPL ELPH-MCNC: 0.5 G/DL
ALPHA2 GLOB MFR SERPL ELPH: 13.1 %
ALPHA2 GLOB SERPL ELPH-MCNC: 0.9 G/DL
B-GLOBULIN MFR SERPL ELPH: 12.7 %
B-GLOBULIN SERPL ELPH-MCNC: 0.9 G/DL
CREAT 24H UR-MCNC: 1.4 G/24 H
CREAT 24H UR-MCNC: 1.4 G/24 H
CREAT ?TM UR-MCNC: 58 MG/DL
CREAT ?TM UR-MCNC: 58 MG/DL
GAMMA GLOB FLD ELPH-MCNC: 1 G/DL
GAMMA GLOB MFR SERPL ELPH: 13.9 %
INTERPRETATION SERPL IEP-IMP: NORMAL
M PROTEIN SPEC IFE-MCNC: NORMAL
PHOSPHOLIPASE A2 RECEPTOR ELISA: <2 RU/ML
PHOSPHOLIPASE A2 RECEPTOR IFA: NEGATIVE
PROT 24H UR-MRATE: 137 MG/DL
PROT ?TM UR-MCNC: 24 HR
PROT ?TM UR-MCNC: 24 HR
PROT SERPL-MCNC: 7.2 G/DL
PROT SERPL-MCNC: 7.2 G/DL
PROT UR-MCNC: 3288 MG/24 H
SPECIMEN VOL 24H UR: 2400 ML
SPECIMEN VOL 24H UR: 2400 ML

## 2019-02-27 ENCOUNTER — APPOINTMENT (OUTPATIENT)
Dept: NEPHROLOGY | Facility: CLINIC | Age: 34
End: 2019-02-27

## 2019-03-01 ENCOUNTER — INPATIENT (INPATIENT)
Facility: HOSPITAL | Age: 34
LOS: 1 days | Discharge: HOME | End: 2019-03-03
Attending: OBSTETRICS & GYNECOLOGY | Admitting: OBSTETRICS & GYNECOLOGY
Payer: COMMERCIAL

## 2019-03-01 VITALS
HEIGHT: 62 IN | SYSTOLIC BLOOD PRESSURE: 127 MMHG | WEIGHT: 167.99 LBS | RESPIRATION RATE: 18 BRPM | OXYGEN SATURATION: 100 % | DIASTOLIC BLOOD PRESSURE: 82 MMHG | HEART RATE: 82 BPM

## 2019-03-01 DIAGNOSIS — Z3A.00 WEEKS OF GESTATION OF PREGNANCY NOT SPECIFIED: ICD-10-CM

## 2019-03-01 DIAGNOSIS — Z34.80 ENCOUNTER FOR SUPERVISION OF OTHER NORMAL PREGNANCY, UNSPECIFIED TRIMESTER: ICD-10-CM

## 2019-03-01 DIAGNOSIS — O26.899 OTHER SPECIFIED PREGNANCY RELATED CONDITIONS, UNSPECIFIED TRIMESTER: ICD-10-CM

## 2019-03-01 LAB
BASOPHILS # BLD AUTO: 0 K/UL — SIGNIFICANT CHANGE UP (ref 0–0.2)
BASOPHILS NFR BLD AUTO: 0.2 % — SIGNIFICANT CHANGE UP (ref 0–2)
BLD GP AB SCN SERPL QL: NEGATIVE — SIGNIFICANT CHANGE UP
EOSINOPHIL # BLD AUTO: 0.1 K/UL — SIGNIFICANT CHANGE UP (ref 0–0.5)
EOSINOPHIL NFR BLD AUTO: 0.5 % — SIGNIFICANT CHANGE UP (ref 0–6)
HCT VFR BLD CALC: 33.4 % — LOW (ref 34.5–45)
HGB BLD-MCNC: 11.9 G/DL — SIGNIFICANT CHANGE UP (ref 11.5–15.5)
LYMPHOCYTES # BLD AUTO: 13.6 % — SIGNIFICANT CHANGE UP (ref 13–44)
LYMPHOCYTES # BLD AUTO: 2.2 K/UL — SIGNIFICANT CHANGE UP (ref 1–3.3)
MCHC RBC-ENTMCNC: 22.5 PG — LOW (ref 27–34)
MCHC RBC-ENTMCNC: 35.5 GM/DL — SIGNIFICANT CHANGE UP (ref 32–36)
MCV RBC AUTO: 63.5 FL — LOW (ref 80–100)
MONOCYTES # BLD AUTO: 1 K/UL — HIGH (ref 0–0.9)
MONOCYTES NFR BLD AUTO: 6.5 % — SIGNIFICANT CHANGE UP (ref 2–14)
NEUTROPHILS # BLD AUTO: 12.6 K/UL — HIGH (ref 1.8–7.4)
NEUTROPHILS NFR BLD AUTO: 79.3 % — HIGH (ref 43–77)
PLATELET # BLD AUTO: 257 K/UL — SIGNIFICANT CHANGE UP (ref 150–400)
RBC # BLD: 5.26 M/UL — HIGH (ref 3.8–5.2)
RBC # FLD: 14.5 % — SIGNIFICANT CHANGE UP (ref 10.3–14.5)
RH IG SCN BLD-IMP: POSITIVE — SIGNIFICANT CHANGE UP
RH IG SCN BLD-IMP: POSITIVE — SIGNIFICANT CHANGE UP
WBC # BLD: 15.9 K/UL — HIGH (ref 3.8–10.5)
WBC # FLD AUTO: 15.9 K/UL — HIGH (ref 3.8–10.5)

## 2019-03-01 RX ORDER — SODIUM CHLORIDE 9 MG/ML
1000 INJECTION, SOLUTION INTRAVENOUS ONCE
Qty: 0 | Refills: 0 | Status: COMPLETED | OUTPATIENT
Start: 2019-03-01 | End: 2019-03-01

## 2019-03-01 RX ORDER — DIPHENHYDRAMINE HCL 50 MG
25 CAPSULE ORAL EVERY 6 HOURS
Qty: 0 | Refills: 0 | Status: DISCONTINUED | OUTPATIENT
Start: 2019-03-02 | End: 2019-03-03

## 2019-03-01 RX ORDER — GLYCERIN ADULT
1 SUPPOSITORY, RECTAL RECTAL AT BEDTIME
Qty: 0 | Refills: 0 | Status: DISCONTINUED | OUTPATIENT
Start: 2019-03-02 | End: 2019-03-03

## 2019-03-01 RX ORDER — PRAMOXINE HYDROCHLORIDE 150 MG/15G
1 AEROSOL, FOAM RECTAL EVERY 4 HOURS
Qty: 0 | Refills: 0 | Status: DISCONTINUED | OUTPATIENT
Start: 2019-03-02 | End: 2019-03-03

## 2019-03-01 RX ORDER — OXYCODONE HYDROCHLORIDE 5 MG/1
5 TABLET ORAL EVERY 4 HOURS
Qty: 0 | Refills: 0 | Status: DISCONTINUED | OUTPATIENT
Start: 2019-03-01 | End: 2019-03-03

## 2019-03-01 RX ORDER — SODIUM CHLORIDE 9 MG/ML
1000 INJECTION, SOLUTION INTRAVENOUS
Qty: 0 | Refills: 0 | Status: DISCONTINUED | OUTPATIENT
Start: 2019-03-01 | End: 2019-03-01

## 2019-03-01 RX ORDER — IBUPROFEN 200 MG
600 TABLET ORAL EVERY 6 HOURS
Qty: 0 | Refills: 0 | Status: COMPLETED | OUTPATIENT
Start: 2019-03-01 | End: 2020-01-28

## 2019-03-01 RX ORDER — SODIUM CHLORIDE 9 MG/ML
3 INJECTION INTRAMUSCULAR; INTRAVENOUS; SUBCUTANEOUS EVERY 8 HOURS
Qty: 0 | Refills: 0 | Status: DISCONTINUED | OUTPATIENT
Start: 2019-03-01 | End: 2019-03-01

## 2019-03-01 RX ORDER — OXYTOCIN 10 UNIT/ML
41.67 VIAL (ML) INJECTION
Qty: 20 | Refills: 0 | Status: DISCONTINUED | OUTPATIENT
Start: 2019-03-01 | End: 2019-03-01

## 2019-03-01 RX ORDER — OXYTOCIN 10 UNIT/ML
41.67 VIAL (ML) INJECTION
Qty: 20 | Refills: 0 | Status: DISCONTINUED | OUTPATIENT
Start: 2019-03-02 | End: 2019-03-03

## 2019-03-01 RX ORDER — AER TRAVELER 0.5 G/1
1 SOLUTION RECTAL; TOPICAL EVERY 4 HOURS
Qty: 0 | Refills: 0 | Status: DISCONTINUED | OUTPATIENT
Start: 2019-03-01 | End: 2019-03-01

## 2019-03-01 RX ORDER — OXYTOCIN 10 UNIT/ML
333.33 VIAL (ML) INJECTION
Qty: 20 | Refills: 0 | Status: DISCONTINUED | OUTPATIENT
Start: 2019-03-01 | End: 2019-03-01

## 2019-03-01 RX ORDER — DOCUSATE SODIUM 100 MG
100 CAPSULE ORAL
Qty: 0 | Refills: 0 | Status: DISCONTINUED | OUTPATIENT
Start: 2019-03-02 | End: 2019-03-03

## 2019-03-01 RX ORDER — DIBUCAINE 1 %
1 OINTMENT (GRAM) RECTAL EVERY 4 HOURS
Qty: 0 | Refills: 0 | Status: DISCONTINUED | OUTPATIENT
Start: 2019-03-02 | End: 2019-03-03

## 2019-03-01 RX ORDER — OXYTOCIN 10 UNIT/ML
2 VIAL (ML) INJECTION
Qty: 30 | Refills: 0 | Status: DISCONTINUED | OUTPATIENT
Start: 2019-03-01 | End: 2019-03-01

## 2019-03-01 RX ORDER — OXYCODONE HYDROCHLORIDE 5 MG/1
5 TABLET ORAL
Qty: 0 | Refills: 0 | Status: DISCONTINUED | OUTPATIENT
Start: 2019-03-01 | End: 2019-03-03

## 2019-03-01 RX ORDER — DIBUCAINE 1 %
1 OINTMENT (GRAM) RECTAL EVERY 4 HOURS
Qty: 0 | Refills: 0 | Status: DISCONTINUED | OUTPATIENT
Start: 2019-03-01 | End: 2019-03-01

## 2019-03-01 RX ORDER — LANOLIN
1 OINTMENT (GRAM) TOPICAL EVERY 6 HOURS
Qty: 0 | Refills: 0 | Status: DISCONTINUED | OUTPATIENT
Start: 2019-03-02 | End: 2019-03-03

## 2019-03-01 RX ORDER — SIMETHICONE 80 MG/1
80 TABLET, CHEWABLE ORAL EVERY 6 HOURS
Qty: 0 | Refills: 0 | Status: DISCONTINUED | OUTPATIENT
Start: 2019-03-02 | End: 2019-03-03

## 2019-03-01 RX ORDER — PRAMOXINE HYDROCHLORIDE 150 MG/15G
1 AEROSOL, FOAM RECTAL EVERY 4 HOURS
Qty: 0 | Refills: 0 | Status: DISCONTINUED | OUTPATIENT
Start: 2019-03-01 | End: 2019-03-01

## 2019-03-01 RX ORDER — LEVOTHYROXINE SODIUM 125 MCG
50 TABLET ORAL DAILY
Qty: 0 | Refills: 0 | Status: DISCONTINUED | OUTPATIENT
Start: 2019-03-02 | End: 2019-03-03

## 2019-03-01 RX ORDER — MAGNESIUM HYDROXIDE 400 MG/1
30 TABLET, CHEWABLE ORAL
Qty: 0 | Refills: 0 | Status: DISCONTINUED | OUTPATIENT
Start: 2019-03-02 | End: 2019-03-03

## 2019-03-01 RX ORDER — SODIUM CHLORIDE 9 MG/ML
3 INJECTION INTRAMUSCULAR; INTRAVENOUS; SUBCUTANEOUS EVERY 8 HOURS
Qty: 0 | Refills: 0 | Status: DISCONTINUED | OUTPATIENT
Start: 2019-03-02 | End: 2019-03-03

## 2019-03-01 RX ORDER — ACETAMINOPHEN 500 MG
975 TABLET ORAL EVERY 6 HOURS
Qty: 0 | Refills: 0 | Status: COMPLETED | OUTPATIENT
Start: 2019-03-01 | End: 2020-01-28

## 2019-03-01 RX ORDER — HYDROCORTISONE 1 %
1 OINTMENT (GRAM) TOPICAL EVERY 4 HOURS
Qty: 0 | Refills: 0 | Status: DISCONTINUED | OUTPATIENT
Start: 2019-03-01 | End: 2019-03-01

## 2019-03-01 RX ORDER — TETANUS TOXOID, REDUCED DIPHTHERIA TOXOID AND ACELLULAR PERTUSSIS VACCINE, ADSORBED 5; 2.5; 8; 8; 2.5 [IU]/.5ML; [IU]/.5ML; UG/.5ML; UG/.5ML; UG/.5ML
0.5 SUSPENSION INTRAMUSCULAR ONCE
Qty: 0 | Refills: 0 | Status: DISCONTINUED | OUTPATIENT
Start: 2019-03-02 | End: 2019-03-03

## 2019-03-01 RX ORDER — KETOROLAC TROMETHAMINE 30 MG/ML
30 SYRINGE (ML) INJECTION ONCE
Qty: 0 | Refills: 0 | Status: DISCONTINUED | OUTPATIENT
Start: 2019-03-01 | End: 2019-03-01

## 2019-03-01 RX ORDER — AER TRAVELER 0.5 G/1
1 SOLUTION RECTAL; TOPICAL EVERY 4 HOURS
Qty: 0 | Refills: 0 | Status: DISCONTINUED | OUTPATIENT
Start: 2019-03-02 | End: 2019-03-03

## 2019-03-01 RX ORDER — CITRIC ACID/SODIUM CITRATE 300-500 MG
15 SOLUTION, ORAL ORAL EVERY 4 HOURS
Qty: 0 | Refills: 0 | Status: DISCONTINUED | OUTPATIENT
Start: 2019-03-01 | End: 2019-03-01

## 2019-03-01 RX ORDER — HYDROCORTISONE 1 %
1 OINTMENT (GRAM) TOPICAL EVERY 4 HOURS
Qty: 0 | Refills: 0 | Status: DISCONTINUED | OUTPATIENT
Start: 2019-03-02 | End: 2019-03-03

## 2019-03-01 RX ADMIN — Medication 2 MILLIUNIT(S)/MIN: at 15:25

## 2019-03-01 RX ADMIN — SODIUM CHLORIDE 2000 MILLILITER(S): 9 INJECTION, SOLUTION INTRAVENOUS at 14:00

## 2019-03-01 RX ADMIN — Medication 30 MILLIGRAM(S): at 21:49

## 2019-03-01 NOTE — PRE-ANESTHESIA EVALUATION ADULT - NSANTHAGERD_ENT_A_CORE
Problem: Patient Care Overview  Goal: Plan of Care/Patient Progress Review  Outcome: Improving  Patient A&O. VSS on RA. Denies pain. Up SBA. BLE edema. Wound on shin, dressing CDI. WOC consulted for wound on buttocks. Tolerating low fat diet. Tele: SR with BBB. Will continue to monitor.        No

## 2019-03-01 NOTE — PRE-ANESTHESIA EVALUATION ADULT - NSANTHOSAYNRD_GEN_A_CORE
No. JOBY screening performed.  STOP BANG Legend: 0-2 = LOW Risk; 3-4 = INTERMEDIATE Risk; 5-8 = HIGH Risk

## 2019-03-01 NOTE — PATIENT PROFILE OB - BELONGINGS, PROFILE
cell phone/clothing/shoes tote/clothing/shoes/wallet/money (see comment)/cell phone/glasses/purse/credit cards/laptop

## 2019-03-02 ENCOUNTER — TRANSCRIPTION ENCOUNTER (OUTPATIENT)
Age: 34
End: 2019-03-02

## 2019-03-02 LAB
HCT VFR BLD CALC: 30.5 % — LOW (ref 34.5–45)
HGB BLD-MCNC: 9.5 G/DL — LOW (ref 11.5–15.5)

## 2019-03-02 RX ORDER — ACETAMINOPHEN 500 MG
975 TABLET ORAL EVERY 6 HOURS
Qty: 0 | Refills: 0 | Status: DISCONTINUED | OUTPATIENT
Start: 2019-03-02 | End: 2019-03-03

## 2019-03-02 RX ORDER — IBUPROFEN 200 MG
1 TABLET ORAL
Qty: 0 | Refills: 0 | COMMUNITY
Start: 2019-03-02

## 2019-03-02 RX ORDER — LEVOTHYROXINE SODIUM 125 MCG
1 TABLET ORAL
Qty: 0 | Refills: 0 | COMMUNITY
Start: 2019-03-02

## 2019-03-02 RX ORDER — IBUPROFEN 200 MG
600 TABLET ORAL EVERY 6 HOURS
Qty: 0 | Refills: 0 | Status: DISCONTINUED | OUTPATIENT
Start: 2019-03-02 | End: 2019-03-03

## 2019-03-02 RX ORDER — ACETAMINOPHEN 500 MG
3 TABLET ORAL
Qty: 0 | Refills: 0 | DISCHARGE
Start: 2019-03-02

## 2019-03-02 RX ADMIN — Medication 50 MICROGRAM(S): at 06:37

## 2019-03-02 RX ADMIN — SODIUM CHLORIDE 3 MILLILITER(S): 9 INJECTION INTRAMUSCULAR; INTRAVENOUS; SUBCUTANEOUS at 06:30

## 2019-03-02 RX ADMIN — Medication 1 TABLET(S): at 12:54

## 2019-03-02 NOTE — PROGRESS NOTE ADULT - SUBJECTIVE AND OBJECTIVE BOX
OB Progress Note:  PPD#1    S: 32yo  PPD#1 s/p . Patient feels well. Pain is well controlled. She is tolerating a regular diet and passing flatus. She is voiding spontaneously, and ambulating without difficulty. Denies CP/SOB. Denies lightheadedness/dizziness. Denies N/V.    O:  Vitals:  Vital Signs Last 24 Hrs  T(C): 37.1 (02 Mar 2019 00:20), Max: 37.3 (01 Mar 2019 21:05)  T(F): 98.8 (02 Mar 2019 00:20), Max: 99.1 (01 Mar 2019 21:05)  HR: 99 (02 Mar 2019 00:20) (82 - 115)  BP: 112/70 (02 Mar 2019 00:20) (112/70 - 138/60)  BP(mean): 88 (01 Mar 2019 22:50) (86 - 90)  RR: 18 (02 Mar 2019 00:20) (16 - 19)  SpO2: 99% (01 Mar 2019 23:05) (99% - 100%)    MEDICATIONS  (STANDING):  acetaminophen   Tablet .. 975 milliGRAM(s) Oral every 6 hours  diphtheria/tetanus/pertussis (acellular) Vaccine (ADAcel) 0.5 milliLiter(s) IntraMuscular once  ibuprofen  Tablet. 600 milliGRAM(s) Oral every 6 hours  levothyroxine 50 MICROGram(s) Oral daily  oxyCODONE    IR 5 milliGRAM(s) Oral every 3 hours  oxytocin Infusion 41.667 milliUNIT(s)/Min (125 mL/Hr) IV Continuous <Continuous>  prenatal multivitamin 1 Tablet(s) Oral daily  sodium chloride 0.9% lock flush 3 milliLiter(s) IV Push every 8 hours      Labs:  Blood type: O Positive  Rubella IgG: RPR:                           11.9   15.9<H> >-----------< 257    (  @ 14:12 )             33.4<L>            Physical Exam:  General: NAD  Abdomen: soft, non-tender, non-distended, fundus firm  Vaginal: Lochia wnl  Extremities: No erythema/edema

## 2019-03-02 NOTE — DISCHARGE NOTE OB - MATERIALS PROVIDED
[facility] Hearing Screen Program/Shaken Baby Prevention Handout/[facility]  Screening Program/Back To Sleep Handout/Breastfeeding Mother’s Support Group Information

## 2019-03-02 NOTE — DISCHARGE NOTE OB - HOSPITAL COURSE
Patient admitted in labor and had an uncomplicated vaginal delivery.  Patient had an unremarkable postpartum course and was stable for discharge home on postpartum day 2.

## 2019-03-02 NOTE — DISCHARGE NOTE OB - CARE PLAN
Principal Discharge DX:	Vaginal delivery  Goal:	Routine postpartum care  Assessment and plan of treatment:	Please call office for 6 week postpartum visit

## 2019-03-02 NOTE — DISCHARGE NOTE OB - PATIENT PORTAL LINK FT
You can access the [facility] Patient Portal, offered by [facility], by registering with the following website: [email or web address]

## 2019-03-02 NOTE — DISCHARGE NOTE OB - MEDICATION SUMMARY - MEDICATIONS TO TAKE
I will START or STAY ON the medications listed below when I get home from the hospital:    acetaminophen 325 mg oral tablet  -- 3 tab(s) by mouth every 6 hours  -- Indication: For pain    ibuprofen 600 mg oral tablet  -- 1 tab(s) by mouth every 6 hours  -- Indication: For pain    Prenatal Multivitamins with Folic Acid 1 mg oral tablet  -- 1 tab(s) by mouth once a day  -- Indication: For routine postpartum care    levothyroxine 50 mcg (0.05 mg) oral tablet  -- 1 tab(s) by mouth once a day  -- Indication: For hypothyroidism

## 2019-03-02 NOTE — PROGRESS NOTE ADULT - PROBLEM SELECTOR PLAN 1
- Pain well controlled, continue current pain regimen  - Increase ambulation, SCDs when not ambulating  - Continue regular diet    Blanka Salcedo PGY1

## 2019-03-03 VITALS
RESPIRATION RATE: 18 BRPM | TEMPERATURE: 98 F | DIASTOLIC BLOOD PRESSURE: 60 MMHG | HEART RATE: 76 BPM | SYSTOLIC BLOOD PRESSURE: 117 MMHG

## 2019-03-03 LAB — T PALLIDUM AB TITR SER: NEGATIVE — SIGNIFICANT CHANGE UP

## 2019-03-03 PROCEDURE — 59050 FETAL MONITOR W/REPORT: CPT

## 2019-03-03 PROCEDURE — 86901 BLOOD TYPING SEROLOGIC RH(D): CPT

## 2019-03-03 PROCEDURE — 90707 MMR VACCINE SC: CPT

## 2019-03-03 PROCEDURE — 86850 RBC ANTIBODY SCREEN: CPT

## 2019-03-03 PROCEDURE — 86900 BLOOD TYPING SEROLOGIC ABO: CPT

## 2019-03-03 PROCEDURE — 85018 HEMOGLOBIN: CPT

## 2019-03-03 PROCEDURE — 85014 HEMATOCRIT: CPT

## 2019-03-03 PROCEDURE — G0463: CPT

## 2019-03-03 PROCEDURE — 85027 COMPLETE CBC AUTOMATED: CPT

## 2019-03-03 PROCEDURE — 86780 TREPONEMA PALLIDUM: CPT

## 2019-03-03 PROCEDURE — 59025 FETAL NON-STRESS TEST: CPT

## 2019-03-03 RX ADMIN — Medication 0.5 MILLILITER(S): at 10:19

## 2019-03-03 RX ADMIN — Medication 50 MICROGRAM(S): at 05:42

## 2019-03-03 NOTE — PROGRESS NOTE ADULT - ASSESSMENT
PPD2 s/p , doing well   -Discharge home   -Precautions given   -Follow-up in office in 6 weeks      Juli Erickson MD

## 2019-03-03 NOTE — PROGRESS NOTE ADULT - SUBJECTIVE AND OBJECTIVE BOX
OB Attending Note    S: Patient doing well. Minimal lochia. Pain controlled.    O: Vital Signs Last 24 Hrs  T(C): 36.9 (03 Mar 2019 06:32), Max: 36.9 (03 Mar 2019 06:32)  T(F): 98.4 (03 Mar 2019 06:32), Max: 98.4 (03 Mar 2019 06:32)  HR: 76 (03 Mar 2019 06:32) (76 - 93)  BP: 117/60 (03 Mar 2019 06:32) (117/60 - 124/82)  BP(mean): --  RR: 18 (03 Mar 2019 06:32) (18 - 18)  SpO2: --    Gen: NAD  Abd: soft, NT, ND, fundus firm below umbilicus  Lochia: min  Perineum healing well  Ext: no tenderness    Labs:                        9.5    x     )-----------( x        ( 02 Mar 2019 10:12 )             30.5

## 2019-03-05 ENCOUNTER — OTHER (OUTPATIENT)
Age: 34
End: 2019-03-05

## 2019-04-12 ENCOUNTER — RESULT REVIEW (OUTPATIENT)
Age: 34
End: 2019-04-12

## 2019-04-19 ENCOUNTER — TRANSCRIPTION ENCOUNTER (OUTPATIENT)
Age: 34
End: 2019-04-19

## 2019-05-29 ENCOUNTER — APPOINTMENT (OUTPATIENT)
Dept: NEPHROLOGY | Facility: CLINIC | Age: 34
End: 2019-05-29
Payer: COMMERCIAL

## 2019-05-29 VITALS
SYSTOLIC BLOOD PRESSURE: 122 MMHG | HEIGHT: 62 IN | HEART RATE: 65 BPM | BODY MASS INDEX: 27.97 KG/M2 | OXYGEN SATURATION: 99 % | DIASTOLIC BLOOD PRESSURE: 57 MMHG | WEIGHT: 152 LBS

## 2019-05-29 VITALS — SYSTOLIC BLOOD PRESSURE: 110 MMHG | DIASTOLIC BLOOD PRESSURE: 60 MMHG

## 2019-05-29 PROCEDURE — 99214 OFFICE O/P EST MOD 30 MIN: CPT

## 2019-05-29 NOTE — ASSESSMENT
[FreeTextEntry1] : 33 year old 3 mos post partum; seen during pregnancy for worsening of her proteinuria during pregnancy;\par \par #Proteinuria/hematuria- ?etiology/likely underlying renal disease/GN; recheck ua, prot/cr today; may need renal biopsy; will decide based on labs and urine results today; pt can not get results from past\par \par #complicated renal cyst- advised to follow up with Urology \par \par #BP stable now; uptrended slightly during pregnancy\par \par #check renal panel\par \par #pt not taking nsaids, asa, or herbal or omega 3; advised not to

## 2019-05-29 NOTE — HISTORY OF PRESENT ILLNESS
[FreeTextEntry1] : Delivered at 40weeks via vaginal delivery, Baby almost 3 months now; \par Denies leg swelling\par no sob\par no urinary symptoms\par no cloudy or foamy urine\par no gross hematuria\par all ros negative \par not breastfeeding\par off synthroid as tsh back to normal\par all ros negative\par \par \par

## 2019-05-29 NOTE — PHYSICAL EXAM
[General Appearance - Alert] : alert [General Appearance - In No Acute Distress] : in no acute distress [General Appearance - Well Nourished] : well nourished [General Appearance - Well Developed] : well developed [Outer Ear] : the ears and nose were normal in appearance [Sclera] : the sclera and conjunctiva were normal [Neck Cervical Mass (___cm)] : no neck mass was observed [Neck Appearance] : the appearance of the neck was normal [Jugular Venous Distention Increased] : there was no jugular-venous distention [Respiration, Rhythm And Depth] : normal respiratory rhythm and effort [Auscultation Breath Sounds / Voice Sounds] : lungs were clear to auscultation bilaterally [Apical Impulse] : the apical impulse was normal [Heart Rate And Rhythm] : heart rate was normal and rhythm regular [Heart Sounds Gallop] : no gallops [Heart Sounds] : normal S1 and S2 [Edema] : there was no peripheral edema [Murmurs] : no murmurs [Abdomen Soft] : soft [Bowel Sounds] : normal bowel sounds [Abdomen Tenderness] : non-tender [No CVA Tenderness] : no ~M costovertebral angle tenderness [Abnormal Walk] : normal gait [Musculoskeletal - Swelling] : no joint swelling seen [Skin Color & Pigmentation] : normal skin color and pigmentation [Skin Turgor] : normal skin turgor [] : no rash [Cranial Nerves] : cranial nerves 2-12 were intact [Oriented To Time, Place, And Person] : oriented to person, place, and time [Affect] : the affect was normal [Impaired Insight] : insight and judgment were intact

## 2019-06-03 LAB
ALBUMIN SERPL ELPH-MCNC: 3.9 G/DL
ANION GAP SERPL CALC-SCNC: 13 MMOL/L
APPEARANCE: CLEAR
APTT BLD: 37.3 SEC
BACTERIA UR CULT: NORMAL
BACTERIA: NEGATIVE
BILIRUBIN URINE: NEGATIVE
BLOOD URINE: ABNORMAL
BUN SERPL-MCNC: 17 MG/DL
CALCIUM SERPL-MCNC: 9.1 MG/DL
CHLORIDE SERPL-SCNC: 105 MMOL/L
CO2 SERPL-SCNC: 25 MMOL/L
COLOR: NORMAL
CREAT SERPL-MCNC: 0.96 MG/DL
CREAT SPEC-SCNC: 82 MG/DL
CREAT/PROT UR: 2.8 RATIO
GLUCOSE QUALITATIVE U: NEGATIVE
GLUCOSE SERPL-MCNC: 73 MG/DL
HYALINE CASTS: 0 /LPF
INR PPP: 0.96 RATIO
KETONES URINE: NEGATIVE
LEUKOCYTE ESTERASE URINE: NEGATIVE
MICROSCOPIC-UA: NORMAL
NITRITE URINE: NEGATIVE
PH URINE: 6.5
PHOSPHATE SERPL-MCNC: 3.4 MG/DL
PHOSPHOLIPASE A2 RECEPTOR ELISA: 2 RU/ML
PHOSPHOLIPASE A2 RECEPTOR IFA: NEGATIVE
POTASSIUM SERPL-SCNC: 4 MMOL/L
PROT UR-MCNC: 224 MG/DL
PROTEIN URINE: ABNORMAL
PT BLD: 10.9 SEC
RED BLOOD CELLS URINE: 26 /HPF
SODIUM SERPL-SCNC: 143 MMOL/L
SPECIFIC GRAVITY URINE: 1.02
SQUAMOUS EPITHELIAL CELLS: 2 /HPF
URINE COMMENTS: NORMAL
UROBILINOGEN URINE: NORMAL
WHITE BLOOD CELLS URINE: 2 /HPF

## 2019-06-19 ENCOUNTER — APPOINTMENT (OUTPATIENT)
Age: 34
End: 2019-06-19
Payer: COMMERCIAL

## 2019-06-19 VITALS
HEART RATE: 62 BPM | SYSTOLIC BLOOD PRESSURE: 120 MMHG | TEMPERATURE: 97.9 F | WEIGHT: 152 LBS | BODY MASS INDEX: 27.8 KG/M2 | OXYGEN SATURATION: 100 % | DIASTOLIC BLOOD PRESSURE: 78 MMHG | RESPIRATION RATE: 17 BRPM

## 2019-06-19 PROCEDURE — 99213 OFFICE O/P EST LOW 20 MIN: CPT

## 2019-06-19 NOTE — HISTORY OF PRESENT ILLNESS
[FreeTextEntry1] : 34 yo F with longstanding history of complex renal cyst\par Last seen by Dr. Gage in jan, 2017\par Currently being worked up by nephrology for proteinuria/hematuria and pending possible renal biopsy\par Recent Renal US showed complex renal cyst in the left kidney which has slightly increased in size\par Denies any symptoms including flank pain, gross hematuria, recurrent UTI

## 2019-06-19 NOTE — PHYSICAL EXAM
[General Appearance - Well Developed] : well developed [General Appearance - Well Nourished] : well nourished [Normal Appearance] : normal appearance [Well Groomed] : well groomed [General Appearance - In No Acute Distress] : no acute distress [Edema] : no peripheral edema [] : no respiratory distress [Respiration, Rhythm And Depth] : normal respiratory rhythm and effort [Exaggerated Use Of Accessory Muscles For Inspiration] : no accessory muscle use [Oriented To Time, Place, And Person] : oriented to person, place, and time [Affect] : the affect was normal [Mood] : the mood was normal [Normal Station and Gait] : the gait and station were normal for the patient's age [Not Anxious] : not anxious [No Focal Deficits] : no focal deficits [No Palpable Adenopathy] : no palpable adenopathy

## 2019-06-19 NOTE — ASSESSMENT
[FreeTextEntry1] : 32 yo F with complex left renal cyst\par \par - Reviewed imaging done in 2016 from [facility] as well as recent renal US\par - Discussed pros and cons of abdominal MRI. Pt eager to proceed.\par - FU after MRI

## 2019-06-21 LAB
BUN SERPL-MCNC: 19 MG/DL
CREAT SERPL-MCNC: 0.93 MG/DL

## 2020-04-26 ENCOUNTER — MESSAGE (OUTPATIENT)
Age: 35
End: 2020-04-26

## 2020-05-28 LAB
SARS-COV-2 IGG SERPL IA-ACNC: <0.1 INDEX
SARS-COV-2 IGG SERPL QL IA: NEGATIVE

## 2020-09-02 ENCOUNTER — RESULT REVIEW (OUTPATIENT)
Age: 35
End: 2020-09-02

## 2020-10-08 ENCOUNTER — APPOINTMENT (OUTPATIENT)
Dept: ANTEPARTUM | Facility: CLINIC | Age: 35
End: 2020-10-08
Payer: COMMERCIAL

## 2020-10-08 ENCOUNTER — ASOB RESULT (OUTPATIENT)
Age: 35
End: 2020-10-08

## 2020-10-08 PROCEDURE — 99201 OFFICE OUTPATIENT NEW 10 MINUTES: CPT | Mod: 25

## 2020-10-08 PROCEDURE — 76801 OB US < 14 WKS SINGLE FETUS: CPT

## 2020-11-25 ENCOUNTER — ASOB RESULT (OUTPATIENT)
Age: 35
End: 2020-11-25

## 2020-11-25 ENCOUNTER — APPOINTMENT (OUTPATIENT)
Dept: ANTEPARTUM | Facility: CLINIC | Age: 35
End: 2020-11-25
Payer: COMMERCIAL

## 2020-11-25 PROCEDURE — 76811 OB US DETAILED SNGL FETUS: CPT

## 2020-11-25 PROCEDURE — 99072 ADDL SUPL MATRL&STAF TM PHE: CPT

## 2021-01-11 ENCOUNTER — ASOB RESULT (OUTPATIENT)
Age: 36
End: 2021-01-11

## 2021-01-11 ENCOUNTER — APPOINTMENT (OUTPATIENT)
Dept: MATERNAL FETAL MEDICINE | Facility: CLINIC | Age: 36
End: 2021-01-11
Payer: COMMERCIAL

## 2021-01-11 PROCEDURE — 99205 OFFICE O/P NEW HI 60 MIN: CPT | Mod: 95

## 2021-01-13 ENCOUNTER — ASOB RESULT (OUTPATIENT)
Age: 36
End: 2021-01-13

## 2021-01-13 ENCOUNTER — APPOINTMENT (OUTPATIENT)
Dept: ANTEPARTUM | Facility: CLINIC | Age: 36
End: 2021-01-13
Payer: COMMERCIAL

## 2021-01-13 PROCEDURE — 99072 ADDL SUPL MATRL&STAF TM PHE: CPT

## 2021-01-13 PROCEDURE — 76816 OB US FOLLOW-UP PER FETUS: CPT

## 2021-01-26 ENCOUNTER — NON-APPOINTMENT (OUTPATIENT)
Age: 36
End: 2021-01-26

## 2021-01-26 ENCOUNTER — APPOINTMENT (OUTPATIENT)
Dept: NEPHROLOGY | Facility: CLINIC | Age: 36
End: 2021-01-26
Payer: COMMERCIAL

## 2021-01-26 PROCEDURE — 99214 OFFICE O/P EST MOD 30 MIN: CPT | Mod: 95

## 2021-01-26 NOTE — ASSESSMENT
[FreeTextEntry1] : 35 year old  h/o proteinuria/hematuria noted during first pregnancy in 2019 and now pregnant again 29 weeks pregnant\par \par #Proteinuria/hematuria- renal cysts as well ?polycystic; etiology likely underlying renal disease ?cystic/ chronicGN;  patient had deferred renal biopsy in the past and then lost to follow up since May 2019; Labs reviewed with patient from 12/25 at OB visit: hb 9.2, 24 hr protein 2g\par Labs from the past during 2019 during pregnancy proteinuria had peaked 3.1 then 2.8 in May 2019 3 mos post partum; do not have any proteinuria quantification between that.  \par will check ua, prot/cr\par \par #complicated renal cyst- advised to follow up with Urology; will repeat renal sono for cysts once she is post partum \par \par #Anemia -on slow Fe daily per pt started by OB; hb 9.2 in December\par \par #BP stable now per patient; uptrended slightly during last pregnancy; advised how to monitor BP \par \par #risk of PEC- advised her to speak to OB today about baby ASA; counseled on signs/sx of PE\par \par #advised that if chronic GN a renal biosy is indicated bc the longer she goes with proteinuria/untreated kidney disease it can eventually lead to scarring and renal function loss\par \par #check renal panel/PEC labs/ua. prot/cr this week\par \par \par  \par \par \par \par \par \par \par  \par \par \par \par \par \par  \par

## 2021-02-08 ENCOUNTER — APPOINTMENT (OUTPATIENT)
Dept: MATERNAL FETAL MEDICINE | Facility: CLINIC | Age: 36
End: 2021-02-08

## 2021-02-10 ENCOUNTER — APPOINTMENT (OUTPATIENT)
Dept: ANTEPARTUM | Facility: CLINIC | Age: 36
End: 2021-02-10
Payer: COMMERCIAL

## 2021-02-10 ENCOUNTER — ASOB RESULT (OUTPATIENT)
Age: 36
End: 2021-02-10

## 2021-02-10 PROCEDURE — 76816 OB US FOLLOW-UP PER FETUS: CPT

## 2021-02-10 PROCEDURE — 99213 OFFICE O/P EST LOW 20 MIN: CPT | Mod: 25

## 2021-02-10 PROCEDURE — 99072 ADDL SUPL MATRL&STAF TM PHE: CPT

## 2021-03-09 ENCOUNTER — OUTPATIENT (OUTPATIENT)
Dept: OUTPATIENT SERVICES | Facility: HOSPITAL | Age: 36
LOS: 1 days | Discharge: HOME | End: 2021-03-09

## 2021-03-09 DIAGNOSIS — D64.9 ANEMIA, UNSPECIFIED: ICD-10-CM

## 2021-03-10 ENCOUNTER — RESULT REVIEW (OUTPATIENT)
Age: 36
End: 2021-03-10

## 2021-03-10 ENCOUNTER — APPOINTMENT (OUTPATIENT)
Dept: HEMATOLOGY ONCOLOGY | Facility: CLINIC | Age: 36
End: 2021-03-10
Payer: COMMERCIAL

## 2021-03-10 ENCOUNTER — ASOB RESULT (OUTPATIENT)
Age: 36
End: 2021-03-10

## 2021-03-10 ENCOUNTER — APPOINTMENT (OUTPATIENT)
Dept: ANTEPARTUM | Facility: CLINIC | Age: 36
End: 2021-03-10
Payer: COMMERCIAL

## 2021-03-10 VITALS
DIASTOLIC BLOOD PRESSURE: 72 MMHG | BODY MASS INDEX: 31.4 KG/M2 | RESPIRATION RATE: 18 BRPM | WEIGHT: 170.63 LBS | OXYGEN SATURATION: 99 % | TEMPERATURE: 97.3 F | SYSTOLIC BLOOD PRESSURE: 115 MMHG | HEIGHT: 62 IN | HEART RATE: 97 BPM

## 2021-03-10 DIAGNOSIS — E61.1 IRON DEFICIENCY: ICD-10-CM

## 2021-03-10 DIAGNOSIS — D64.9 ANEMIA, UNSPECIFIED: ICD-10-CM

## 2021-03-10 DIAGNOSIS — D50.9 IRON DEFICIENCY ANEMIA, UNSPECIFIED: ICD-10-CM

## 2021-03-10 LAB
BASOPHILS # BLD AUTO: 0.03 K/UL — SIGNIFICANT CHANGE UP (ref 0–0.2)
BASOPHILS NFR BLD AUTO: 0.3 % — SIGNIFICANT CHANGE UP (ref 0–2)
EOSINOPHIL # BLD AUTO: 0.23 K/UL — SIGNIFICANT CHANGE UP (ref 0–0.5)
EOSINOPHIL NFR BLD AUTO: 2.3 % — SIGNIFICANT CHANGE UP (ref 0–6)
HCT VFR BLD CALC: 32.1 % — LOW (ref 34.5–45)
HGB BLD-MCNC: 10 G/DL — LOW (ref 11.5–15.5)
IMM GRANULOCYTES NFR BLD AUTO: 2.2 % — HIGH (ref 0–1.5)
LYMPHOCYTES # BLD AUTO: 1.51 K/UL — SIGNIFICANT CHANGE UP (ref 1–3.3)
LYMPHOCYTES # BLD AUTO: 15.1 % — SIGNIFICANT CHANGE UP (ref 13–44)
MCHC RBC-ENTMCNC: 21 PG — LOW (ref 27–34)
MCHC RBC-ENTMCNC: 31.2 G/DL — LOW (ref 32–36)
MCV RBC AUTO: 67.4 FL — LOW (ref 80–100)
MONOCYTES # BLD AUTO: 0.68 K/UL — SIGNIFICANT CHANGE UP (ref 0–0.9)
MONOCYTES NFR BLD AUTO: 6.8 % — SIGNIFICANT CHANGE UP (ref 2–14)
NEUTROPHILS # BLD AUTO: 7.32 K/UL — SIGNIFICANT CHANGE UP (ref 1.8–7.4)
NEUTROPHILS NFR BLD AUTO: 73.3 % — SIGNIFICANT CHANGE UP (ref 43–77)
NRBC # BLD: 0 /100 WBCS — SIGNIFICANT CHANGE UP (ref 0–0)
PLATELET # BLD AUTO: 248 K/UL — SIGNIFICANT CHANGE UP (ref 150–400)
RBC # BLD: 4.76 M/UL — SIGNIFICANT CHANGE UP (ref 3.8–5.2)
RBC # FLD: 16.5 % — HIGH (ref 10.3–14.5)
WBC # BLD: 9.99 K/UL — SIGNIFICANT CHANGE UP (ref 3.8–10.5)
WBC # FLD AUTO: 9.99 K/UL — SIGNIFICANT CHANGE UP (ref 3.8–10.5)

## 2021-03-10 PROCEDURE — 76816 OB US FOLLOW-UP PER FETUS: CPT

## 2021-03-10 PROCEDURE — 99072 ADDL SUPL MATRL&STAF TM PHE: CPT

## 2021-03-10 PROCEDURE — 99205 OFFICE O/P NEW HI 60 MIN: CPT

## 2021-03-10 NOTE — HISTORY OF PRESENT ILLNESS
[de-identified] : 35 year-old Ms. CULLEN is seen in consult for iron deficiency anemia during pregnancy. She is currently at 36 weeks and is expecting her baby in 4/14/2021. This is her second pregnancy. The patient did not need any iron supplement in her first pregnancy. She never had a blood transfusion. She is currently on oral iron supplement and is tolerating well. She is not agreeable for IV iron. Of note, the patients suffers microscopic hematuria.

## 2021-03-10 NOTE — ASSESSMENT
[FreeTextEntry1] : 35 year-old Ms. CULLEN is seen in consult for JOSH. She is 36 week pregnant with her second baby. Her CBC is suggestive of thalassemia. May have concurrent iron deficiency. Could not locate a iron panel or ferritin in the labs. She has been on PO iron, tolerating well. Patient is not agreeable for IV iron. Of note, patient has microscopic hematuria. Will proceed with the followings:  \par \par # JOSH in the setting of Pregnancy 36 wks\par - CBC, CMP, Iron panel, Ferritin, B12, Folate, TSH today \par - Hgb Electrophoresis, if normal -- alpha thal gene study \par - Patient is on po iron \par - Declined IV iron \par - COntinue PO iron \par

## 2021-03-12 RX ORDER — CYANOCOBALAMIN 1000 UG/ML
1000 INJECTION INTRAMUSCULAR; SUBCUTANEOUS
Qty: 0 | Refills: 0 | Status: COMPLETED | OUTPATIENT
Start: 2021-03-12

## 2021-03-13 LAB
ALBUMIN SERPL ELPH-MCNC: 3.4 G/DL
ALP BLD-CCNC: 83 U/L
ALT SERPL-CCNC: 7 U/L
ANION GAP SERPL CALC-SCNC: 9 MMOL/L
AST SERPL-CCNC: 16 U/L
BILIRUB SERPL-MCNC: 0.3 MG/DL
BUN SERPL-MCNC: 11 MG/DL
CALCIUM SERPL-MCNC: 9.2 MG/DL
CHLORIDE SERPL-SCNC: 105 MMOL/L
CO2 SERPL-SCNC: 23 MMOL/L
CREAT SERPL-MCNC: 0.83 MG/DL
FERRITIN SERPL-MCNC: 140 NG/ML
FOLATE SERPL-MCNC: 12.7 NG/ML
GLUCOSE SERPL-MCNC: 90 MG/DL
HGB A MFR BLD: 92.9 %
HGB A2 MFR BLD: 5 %
HGB F MFR BLD: 2.1 %
HGB FRACT BLD-IMP: NORMAL
IRON SATN MFR SERPL: 23 %
IRON SERPL-MCNC: 86 UG/DL
POTASSIUM SERPL-SCNC: 4.2 MMOL/L
PROT SERPL-MCNC: 5.8 G/DL
SODIUM SERPL-SCNC: 137 MMOL/L
TIBC SERPL-MCNC: 379 UG/DL
TSH SERPL-ACNC: 1.83 UIU/ML
UIBC SERPL-MCNC: 293 UG/DL
VIT B12 SERPL-MCNC: 216 PG/ML

## 2021-03-17 ENCOUNTER — APPOINTMENT (OUTPATIENT)
Dept: INFUSION THERAPY | Facility: HOSPITAL | Age: 36
End: 2021-03-17

## 2021-03-22 LAB — ALPHA - GLOBIN COMMON MUTATION RESULT: NORMAL

## 2021-03-25 ENCOUNTER — APPOINTMENT (OUTPATIENT)
Dept: INFUSION THERAPY | Facility: HOSPITAL | Age: 36
End: 2021-03-25

## 2021-03-29 RX ORDER — CYANOCOBALAMIN 1000 UG/ML
1000 INJECTION INTRAMUSCULAR; SUBCUTANEOUS
Qty: 0 | Refills: 0 | Status: COMPLETED | OUTPATIENT
Start: 2021-03-29

## 2021-04-01 ENCOUNTER — APPOINTMENT (OUTPATIENT)
Dept: ANTEPARTUM | Facility: CLINIC | Age: 36
End: 2021-04-01
Payer: COMMERCIAL

## 2021-04-01 ENCOUNTER — ASOB RESULT (OUTPATIENT)
Age: 36
End: 2021-04-01

## 2021-04-01 ENCOUNTER — APPOINTMENT (OUTPATIENT)
Dept: INFUSION THERAPY | Facility: HOSPITAL | Age: 36
End: 2021-04-01

## 2021-04-01 PROCEDURE — 99072 ADDL SUPL MATRL&STAF TM PHE: CPT

## 2021-04-01 PROCEDURE — 76816 OB US FOLLOW-UP PER FETUS: CPT

## 2021-04-05 ENCOUNTER — OUTPATIENT (OUTPATIENT)
Dept: OUTPATIENT SERVICES | Facility: HOSPITAL | Age: 36
LOS: 1 days | End: 2021-04-05
Payer: COMMERCIAL

## 2021-04-05 DIAGNOSIS — Z11.52 ENCOUNTER FOR SCREENING FOR COVID-19: ICD-10-CM

## 2021-04-05 PROCEDURE — U0005: CPT

## 2021-04-05 PROCEDURE — C9803: CPT

## 2021-04-05 PROCEDURE — U0003: CPT

## 2021-04-06 LAB — SARS-COV-2 RNA SPEC QL NAA+PROBE: SIGNIFICANT CHANGE UP

## 2021-04-07 ENCOUNTER — INPATIENT (INPATIENT)
Facility: HOSPITAL | Age: 36
LOS: 1 days | Discharge: HOME | End: 2021-04-09
Attending: OBSTETRICS & GYNECOLOGY | Admitting: OBSTETRICS & GYNECOLOGY
Payer: COMMERCIAL

## 2021-04-07 VITALS
HEIGHT: 62 IN | WEIGHT: 169.98 LBS | DIASTOLIC BLOOD PRESSURE: 71 MMHG | TEMPERATURE: 98 F | OXYGEN SATURATION: 98 % | SYSTOLIC BLOOD PRESSURE: 130 MMHG | RESPIRATION RATE: 18 BRPM | HEART RATE: 82 BPM

## 2021-04-07 RX ORDER — SODIUM CHLORIDE 9 MG/ML
1000 INJECTION, SOLUTION INTRAVENOUS
Refills: 0 | Status: DISCONTINUED | OUTPATIENT
Start: 2021-04-07 | End: 2021-04-08

## 2021-04-07 RX ORDER — CITRIC ACID/SODIUM CITRATE 300-500 MG
15 SOLUTION, ORAL ORAL EVERY 6 HOURS
Refills: 0 | Status: DISCONTINUED | OUTPATIENT
Start: 2021-04-07 | End: 2021-04-08

## 2021-04-07 RX ORDER — OXYTOCIN 10 UNIT/ML
333.33 VIAL (ML) INJECTION
Qty: 20 | Refills: 0 | Status: COMPLETED | OUTPATIENT
Start: 2021-04-07 | End: 2021-04-08

## 2021-04-07 RX ADMIN — SODIUM CHLORIDE 125 MILLILITER(S): 9 INJECTION, SOLUTION INTRAVENOUS at 23:30

## 2021-04-07 NOTE — OB PROVIDER H&P - NSFIRSTLIVEBIRTH_OBGYN_ALL_OB_DT
07-Apr-2019 [de-identified] : Patient presents for CPE. He reports that is he is recovering from a URI.  [FreeTextEntry1] : Patient presents for CPE.

## 2021-04-07 NOTE — OB PROVIDER H&P - ATTENDING COMMENTS
PT IS A P1 ADMITTED WITH H/O POLYCYSTIC KIDNEYS FOR IOL AS PER MFM.  I CAME TO SEE HER AT 0615. PT S/P CYTOTEC X 3 & CF. PT HAD SROM AS I ARRIVED- CLEAR FLUID. CVX 4.5-5/80/-2. PT CURRENTLY ON PITOCIN. CTXS IRREG. EPI IN PLACE. PT HAD FAST ACTIVE PHASE & SHORT 2ND STAGE LAST TIME.    KARLA MARK

## 2021-04-07 NOTE — OB RN PATIENT PROFILE - ABORTIONS, OB PROFILE
[facility]    Date: 2018  Patient Name: Kevan Zacarias        MRN: [id number]    Account #: [de-identified]  : 2008  (8 y.o.)  Gender: female     REASON FOR MISSED TREATMENT:    []Cancelled due to illness. [] Therapist Canceled Appointment  []Cancelled due to other appointment   []No Show / No call. Pt's guardian called with next scheduled appointment. [] Cancelled due to transportation conflict  []Cancelled due to weather  []Frequency of order changed  []Patient on hold due to:   [] Excused absence d/t at least 48 hour notice of cancellation  []Cancel /less than 48 hour notice.     [x]OTHER:  Cx pt has dentist appt    Electronically signed by:      Madina Diallo M.S., CCC/SLP            Date:2018
0

## 2021-04-07 NOTE — OB PROVIDER H&P - NSHPPHYSICALEXAM_GEN_ALL_CORE
Vital Signs Last 24 Hrs:    T(C): 36.8 (07 Apr 2021 21:13), Max: 36.8 (07 Apr 2021 21:00)  T(F): 98.24 (07 Apr 2021 21:13), Max: 98.24 (07 Apr 2021 21:13)  HR: 89 (07 Apr 2021 22:22) (78 - 89)  BP: 130/71 (07 Apr 2021 21:13) (130/71 - 130/71)  RR: 18 (07 Apr 2021 21:00) (18 - 18)  SpO2: 96% (07 Apr 2021 22:22) (96% - 99%)    Gen: NAD  CV: RRR  Pulm: CTAB  Abd: soft, non-tender, gravid  VE: 1.5/50/-3  Bedside sono: Vertex  EFM: 125/mod kimani/+Accels/-decels  [place]: q1-2m

## 2021-04-07 NOTE — OB PROVIDER H&P - ASSESSMENT
A/P: 36yo  @ 39w0d here for rrIOL with kidney cyst. COVID neg  -Admit to L&D  -Routine admission labs  -EFM/toco: resuscitative measures   -PO cytotec as IOL agent  -CB @12a  -Anesthesia consult for epidural (call Cafaro)  -Anticipate

## 2021-04-07 NOTE — OB RN PATIENT PROFILE - TOBACCO USE
Nursing Note by Eufemia Cárdenas RN at 06/09/17 08:41 AM     Author:  Eufemia Cárdenas RN Service:  (none) Author Type:  Registered Nurse     Filed:  06/09/17 08:42 AM Encounter Date:  6/9/2017 Status:  Signed     :  Eufemia Cárdenas RN (Registered Nurse)            Patient brought to exam room, vital signs obtained.[AG1.1T]  EKG obtained and results given to Dr. Barnhart.[AG1.1M]      Carla Laird was offered treatment for pain control:[AG1.1T] No.[AG1.1M]       Last visit with SAMANTHA BARNHART was on 02/24/2017 at 12:45 PM [facility]  [facility] with [facility] on 02/24/2017 at 12:45 PM in [facility]  Match done based on reference date of today 6/9/17    Electronically Signed by:    Eufemia Cárdenas RN , 6/9/2017[AG1.1T]                  Revision History        User Key Date/Time User Provider Type Action    > AG1.1 06/09/17 08:42 AM Eufemia Cárdenas RN Registered Nurse Sign    M - Manual, T - Template             Never smoker

## 2021-04-07 NOTE — OB PROVIDER H&P - HISTORY OF PRESENT ILLNESS
Pt. is a 34yo  @ 39w0d here for IOL 2/2 kidney disease (kidney cyst). Pt. denies LOF, VB, painful, regular ctx. Endorses +FM. PNC uncomplicated. GBS (-) EFW 3100g    Obhx: 2019: , 6#3  Gynhx: denies fibroids, ovarian cyst, endometriosis, abnormal pap smears, STIs  Pmedhx: kidney cyst   Surghx: Tonsillectomy  Meds: PNV  Allergies: NKDA  Psychhx: denies depression/anxiety  Sochx: denies ETOH, tobacco, illicit drug use

## 2021-04-08 ENCOUNTER — APPOINTMENT (OUTPATIENT)
Dept: INFUSION THERAPY | Facility: HOSPITAL | Age: 36
End: 2021-04-08

## 2021-04-08 LAB
BLD GP AB SCN SERPL QL: NEGATIVE — SIGNIFICANT CHANGE UP
COVID-19 SPIKE DOMAIN AB INTERP: NEGATIVE — SIGNIFICANT CHANGE UP
COVID-19 SPIKE DOMAIN ANTIBODY RESULT: 0.4 U/ML — SIGNIFICANT CHANGE UP
HCT VFR BLD CALC: 32.9 % — LOW (ref 34.5–45)
HGB BLD-MCNC: 9.9 G/DL — LOW (ref 11.5–15.5)
MCHC RBC-ENTMCNC: 20.6 PG — LOW (ref 27–34)
MCHC RBC-ENTMCNC: 30.1 GM/DL — LOW (ref 32–36)
MCV RBC AUTO: 68.5 FL — LOW (ref 80–100)
NRBC # BLD: 0 /100 WBCS — SIGNIFICANT CHANGE UP (ref 0–0)
PLATELET # BLD AUTO: 234 K/UL — SIGNIFICANT CHANGE UP (ref 150–400)
RBC # BLD: 4.8 M/UL — SIGNIFICANT CHANGE UP (ref 3.8–5.2)
RBC # FLD: 15.2 % — HIGH (ref 10.3–14.5)
RH IG SCN BLD-IMP: POSITIVE — SIGNIFICANT CHANGE UP
SARS-COV-2 IGG+IGM SERPL QL IA: 0.4 U/ML — SIGNIFICANT CHANGE UP
SARS-COV-2 IGG+IGM SERPL QL IA: NEGATIVE — SIGNIFICANT CHANGE UP
T PALLIDUM AB TITR SER: NEGATIVE — SIGNIFICANT CHANGE UP
WBC # BLD: 10.53 K/UL — HIGH (ref 3.8–10.5)
WBC # FLD AUTO: 10.53 K/UL — HIGH (ref 3.8–10.5)

## 2021-04-08 RX ORDER — DIPHENHYDRAMINE HCL 50 MG
25 CAPSULE ORAL EVERY 6 HOURS
Refills: 0 | Status: DISCONTINUED | OUTPATIENT
Start: 2021-04-08 | End: 2021-04-09

## 2021-04-08 RX ORDER — OXYCODONE HYDROCHLORIDE 5 MG/1
5 TABLET ORAL ONCE
Refills: 0 | Status: DISCONTINUED | OUTPATIENT
Start: 2021-04-08 | End: 2021-04-09

## 2021-04-08 RX ORDER — TETANUS TOXOID, REDUCED DIPHTHERIA TOXOID AND ACELLULAR PERTUSSIS VACCINE, ADSORBED 5; 2.5; 8; 8; 2.5 [IU]/.5ML; [IU]/.5ML; UG/.5ML; UG/.5ML; UG/.5ML
0.5 SUSPENSION INTRAMUSCULAR ONCE
Refills: 0 | Status: DISCONTINUED | OUTPATIENT
Start: 2021-04-08 | End: 2021-04-09

## 2021-04-08 RX ORDER — AER TRAVELER 0.5 G/1
1 SOLUTION RECTAL; TOPICAL EVERY 4 HOURS
Refills: 0 | Status: DISCONTINUED | OUTPATIENT
Start: 2021-04-08 | End: 2021-04-09

## 2021-04-08 RX ORDER — LANOLIN
1 OINTMENT (GRAM) TOPICAL EVERY 6 HOURS
Refills: 0 | Status: DISCONTINUED | OUTPATIENT
Start: 2021-04-08 | End: 2021-04-09

## 2021-04-08 RX ORDER — SIMETHICONE 80 MG/1
80 TABLET, CHEWABLE ORAL EVERY 4 HOURS
Refills: 0 | Status: DISCONTINUED | OUTPATIENT
Start: 2021-04-08 | End: 2021-04-09

## 2021-04-08 RX ORDER — KETOROLAC TROMETHAMINE 30 MG/ML
30 SYRINGE (ML) INJECTION ONCE
Refills: 0 | Status: DISCONTINUED | OUTPATIENT
Start: 2021-04-08 | End: 2021-04-08

## 2021-04-08 RX ORDER — BENZOCAINE 10 %
1 GEL (GRAM) MUCOUS MEMBRANE EVERY 6 HOURS
Refills: 0 | Status: DISCONTINUED | OUTPATIENT
Start: 2021-04-08 | End: 2021-04-09

## 2021-04-08 RX ORDER — ACETAMINOPHEN 500 MG
975 TABLET ORAL
Refills: 0 | Status: DISCONTINUED | OUTPATIENT
Start: 2021-04-08 | End: 2021-04-09

## 2021-04-08 RX ORDER — OXYCODONE HYDROCHLORIDE 5 MG/1
5 TABLET ORAL
Refills: 0 | Status: DISCONTINUED | OUTPATIENT
Start: 2021-04-08 | End: 2021-04-09

## 2021-04-08 RX ORDER — SODIUM CHLORIDE 9 MG/ML
3 INJECTION INTRAMUSCULAR; INTRAVENOUS; SUBCUTANEOUS EVERY 8 HOURS
Refills: 0 | Status: DISCONTINUED | OUTPATIENT
Start: 2021-04-08 | End: 2021-04-09

## 2021-04-08 RX ORDER — MAGNESIUM HYDROXIDE 400 MG/1
30 TABLET, CHEWABLE ORAL
Refills: 0 | Status: DISCONTINUED | OUTPATIENT
Start: 2021-04-08 | End: 2021-04-09

## 2021-04-08 RX ORDER — PRAMOXINE HYDROCHLORIDE 150 MG/15G
1 AEROSOL, FOAM RECTAL EVERY 4 HOURS
Refills: 0 | Status: DISCONTINUED | OUTPATIENT
Start: 2021-04-08 | End: 2021-04-09

## 2021-04-08 RX ORDER — IBUPROFEN 200 MG
600 TABLET ORAL EVERY 6 HOURS
Refills: 0 | Status: DISCONTINUED | OUTPATIENT
Start: 2021-04-08 | End: 2021-04-09

## 2021-04-08 RX ORDER — HYDROCORTISONE 1 %
1 OINTMENT (GRAM) TOPICAL EVERY 6 HOURS
Refills: 0 | Status: DISCONTINUED | OUTPATIENT
Start: 2021-04-08 | End: 2021-04-09

## 2021-04-08 RX ORDER — OXYTOCIN 10 UNIT/ML
4 VIAL (ML) INJECTION
Qty: 30 | Refills: 0 | Status: DISCONTINUED | OUTPATIENT
Start: 2021-04-08 | End: 2021-04-09

## 2021-04-08 RX ORDER — SODIUM CHLORIDE 9 MG/ML
1000 INJECTION INTRAMUSCULAR; INTRAVENOUS; SUBCUTANEOUS
Refills: 0 | Status: DISCONTINUED | OUTPATIENT
Start: 2021-04-08 | End: 2021-04-09

## 2021-04-08 RX ORDER — IBUPROFEN 200 MG
600 TABLET ORAL EVERY 6 HOURS
Refills: 0 | Status: COMPLETED | OUTPATIENT
Start: 2021-04-08 | End: 2022-03-07

## 2021-04-08 RX ORDER — SODIUM CHLORIDE 9 MG/ML
500 INJECTION INTRAMUSCULAR; INTRAVENOUS; SUBCUTANEOUS ONCE
Refills: 0 | Status: COMPLETED | OUTPATIENT
Start: 2021-04-08 | End: 2021-04-08

## 2021-04-08 RX ORDER — DIBUCAINE 1 %
1 OINTMENT (GRAM) RECTAL EVERY 6 HOURS
Refills: 0 | Status: DISCONTINUED | OUTPATIENT
Start: 2021-04-08 | End: 2021-04-09

## 2021-04-08 RX ORDER — OXYTOCIN 10 UNIT/ML
333.33 VIAL (ML) INJECTION
Qty: 20 | Refills: 0 | Status: DISCONTINUED | OUTPATIENT
Start: 2021-04-08 | End: 2021-04-09

## 2021-04-08 RX ADMIN — Medication 4 MILLIUNIT(S)/MIN: at 06:05

## 2021-04-08 RX ADMIN — Medication 975 MILLIGRAM(S): at 21:46

## 2021-04-08 RX ADMIN — SODIUM CHLORIDE 125 MILLILITER(S): 9 INJECTION, SOLUTION INTRAVENOUS at 07:56

## 2021-04-08 RX ADMIN — SODIUM CHLORIDE 1000 MILLILITER(S): 9 INJECTION INTRAMUSCULAR; INTRAVENOUS; SUBCUTANEOUS at 07:27

## 2021-04-08 RX ADMIN — Medication 1 TABLET(S): at 14:13

## 2021-04-08 RX ADMIN — Medication 975 MILLIGRAM(S): at 21:07

## 2021-04-08 RX ADMIN — Medication 975 MILLIGRAM(S): at 15:19

## 2021-04-08 RX ADMIN — Medication 600 MILLIGRAM(S): at 17:24

## 2021-04-08 RX ADMIN — Medication 975 MILLIGRAM(S): at 16:05

## 2021-04-08 RX ADMIN — Medication 30 MILLIGRAM(S): at 11:31

## 2021-04-08 RX ADMIN — Medication 600 MILLIGRAM(S): at 23:49

## 2021-04-08 RX ADMIN — Medication 1000 MILLIUNIT(S)/MIN: at 09:22

## 2021-04-08 NOTE — OB RN DELIVERY SUMMARY - NSSELHIDDEN_OBGYN_ALL_OB_FT
[NS_DeliveryAttending1_OBGYN_ALL_OB_FT:[id number]==],[NS_CirculateRN2_OBGYN_ALL_OB_FT:[id number]==]

## 2021-04-08 NOTE — OB PROVIDER LABOR PROGRESS NOTE - ASSESSMENT
progressing well.  will try to reduce anterior lip to expedite delivery.
category 2 tracing  pitocin off  iupc placed for amnioinfusion  
A/P:  -EFM/[place]  -For pitocin  D/w Dr. Sommer Nicholas PA-C
A/P:  -EFM/toco: resuscitative measures  -Continue PO cytotec  -Anesthesia consult for epidural (VE 1st)  -Anticipate

## 2021-04-08 NOTE — OB RN DELIVERY SUMMARY - NS_SEPSISRSKCALC_OBGYN_ALL_OB_FT
EOS calculated successfully. EOS Risk Factor: 0.5/1000 live births ([facility] national incidence); GA=39w1d; Temp=98.24; ROM=2.983; GBS='Negative'; Antibiotics='No antibiotics or any antibiotics < 2 hrs prior to birth'

## 2021-04-08 NOTE — OB PROVIDER LABOR PROGRESS NOTE - NS_SUBJECTIVE/OBJECTIVE_OBGYN_ALL_OB_FT
Pt. seen for cervical antonio balloon placement; pt comfortable at this time; cervical antonio balloon placed under sterile techniques; 60cc NS instilled into uterine/vaginal balloons; pt tolerated procedure well
PA Note:  Patient seen and evaluated at bedside.     Cervical balloon found to be in vagina and removed.
cramping
feeling pressure

## 2021-04-08 NOTE — OB PROVIDER DELIVERY SUMMARY - NSPROVIDERDELIVERYNOTE_OBGYN_ALL_OB_FT
Delivery Note:    Patient progressed to fd and had a short second stage (3 or 4 contractions).   was performed with epidural anesthesia without episiotomy.   viable female was delivered luda position, can x 2 tight.  cord was clamped and cut and the delivery proceeded easily.  cord blood and cord gases to lab.  placenta delivered spontaneously and uterine exploration was wnl.  cervix and vagina intact.  In addition to iv pitocin in the bag of fluid, cytotec 1000 was given per rectum with good result.  small second degree laceration was repaired in the usual manner with 00/000 rapide.  All counts were correct after delivery.  Patient tolerated all procedures well.

## 2021-04-09 ENCOUNTER — TRANSCRIPTION ENCOUNTER (OUTPATIENT)
Age: 36
End: 2021-04-09

## 2021-04-09 VITALS
HEART RATE: 72 BPM | DIASTOLIC BLOOD PRESSURE: 72 MMHG | SYSTOLIC BLOOD PRESSURE: 123 MMHG | OXYGEN SATURATION: 99 % | TEMPERATURE: 98 F | RESPIRATION RATE: 17 BRPM

## 2021-04-09 DIAGNOSIS — N28.1 CYST OF KIDNEY, ACQUIRED: ICD-10-CM

## 2021-04-09 PROCEDURE — 85027 COMPLETE CBC AUTOMATED: CPT

## 2021-04-09 PROCEDURE — 86900 BLOOD TYPING SEROLOGIC ABO: CPT

## 2021-04-09 PROCEDURE — 59050 FETAL MONITOR W/REPORT: CPT

## 2021-04-09 PROCEDURE — 86780 TREPONEMA PALLIDUM: CPT

## 2021-04-09 PROCEDURE — 90707 MMR VACCINE SC: CPT

## 2021-04-09 PROCEDURE — 86850 RBC ANTIBODY SCREEN: CPT

## 2021-04-09 PROCEDURE — 59025 FETAL NON-STRESS TEST: CPT

## 2021-04-09 PROCEDURE — 86901 BLOOD TYPING SEROLOGIC RH(D): CPT

## 2021-04-09 PROCEDURE — 86769 SARS-COV-2 COVID-19 ANTIBODY: CPT

## 2021-04-09 RX ORDER — ACETAMINOPHEN 500 MG
2 TABLET ORAL
Qty: 0 | Refills: 0 | DISCHARGE
Start: 2021-04-09

## 2021-04-09 RX ADMIN — Medication 600 MILLIGRAM(S): at 12:10

## 2021-04-09 RX ADMIN — Medication 0.5 MILLILITER(S): at 13:07

## 2021-04-09 RX ADMIN — Medication 600 MILLIGRAM(S): at 12:40

## 2021-04-09 RX ADMIN — Medication 975 MILLIGRAM(S): at 03:39

## 2021-04-09 RX ADMIN — Medication 975 MILLIGRAM(S): at 09:24

## 2021-04-09 RX ADMIN — Medication 600 MILLIGRAM(S): at 05:49

## 2021-04-09 RX ADMIN — Medication 1 TABLET(S): at 12:10

## 2021-04-09 RX ADMIN — Medication 975 MILLIGRAM(S): at 09:40

## 2021-04-09 RX ADMIN — Medication 975 MILLIGRAM(S): at 04:15

## 2021-04-09 RX ADMIN — Medication 600 MILLIGRAM(S): at 00:30

## 2021-04-09 NOTE — DISCHARGE NOTE OB - PATIENT PORTAL LINK FT
You can access the FollowMyHealth Patient Portal offered by [facility] by registering at the following website: [email or web address]. By joining [employer]’s FollowMyHealth portal, you will also be able to view your health information using other applications (apps) compatible with our system.

## 2021-04-09 NOTE — DISCHARGE NOTE OB - PLAN OF CARE
Please f/u with nephrologist Please call to schedule follow up in 1 month. Please call office for 6 week postpartum visit appt Routine postpartum care

## 2021-04-09 NOTE — PROGRESS NOTE ADULT - SUBJECTIVE AND OBJECTIVE BOX
Postpartum Note- PPD#1    Prenatal Labs  Blood type: O Positive  Rubella IgG: Immune  RPR: Negative        S:Patient w/o complaints, pain is controlled.    Pt is OOB, tolerating PO, voiding. Lochia WNL.     O:  Vital Signs Last 24 Hrs  T(C): 36.4 (09 Apr 2021 05:57), Max: 37 (08 Apr 2021 12:30)  T(F): 97.5 (09 Apr 2021 05:57), Max: 98.6 (08 Apr 2021 12:30)  HR: 65 (09 Apr 2021 05:57) (64 - 102)  BP: 119/80 (09 Apr 2021 05:57) (104/54 - 154/97)  BP(mean): 85 (08 Apr 2021 11:50) (80 - 118)  RR: 18 (09 Apr 2021 05:57) (15 - 18)  SpO2: 98% (09 Apr 2021 05:57) (90% - 100%)     Gen: NAD  Abdomen: Soft, nontender, non-distended, fundus firm.  Vaginal: Lochia WNL,    Ext:  Neg calf tenderness    LABS:    Hemoglobin: 9.9 g/dL (04-08 @ 00:30)      Hematocrit: 32.9 % (04-08 @ 00:30)

## 2021-04-09 NOTE — DISCHARGE NOTE OB - MEDICATION SUMMARY - MEDICATIONS TO TAKE
I will START or STAY ON the medications listed below when I get home from the hospital:    ibuprofen 600 mg oral tablet  -- 1 tab(s) by mouth every 6 hours  -- Indication: For pain    acetaminophen 500 mg oral tablet  -- 2 tab(s) by mouth every 6 hours  -- Indication: For pain    Prenatal Multivitamins with Folic Acid 1 mg oral tablet  -- 1 tab(s) by mouth once a day  -- Indication: For routine    levothyroxine 50 mcg (0.05 mg) oral tablet  -- 1 tab(s) by mouth once a day  -- Indication: For hypothyroidism

## 2021-04-09 NOTE — DISCHARGE NOTE OB - CARE PROVIDER_API CALL
Juli Erickson  OBSTETRICS AND GYNECOLOGY  [facility] [place], [address] [place] [address] [address]  [place] [address]  Phone: [phone]  Fax: [phone]  Follow Up Time:

## 2021-04-09 NOTE — DISCHARGE NOTE OB - HOSPITAL COURSE
Patient admitted for induction of labor] and had an uncomplicated vaginal delivery.  Patient had an unremarkable postpartum course and was stable for discharge home on postpartum day 1.  MMR offered for nonimmunity to measles.

## 2021-04-09 NOTE — DISCHARGE NOTE OB - MATERIALS PROVIDED
[facility] [place] Screening Program/Breastfeeding Log/Breastfeeding Mother’s Support Group Information/Guide to Postpartum Care/[facility] Hearing Screen Program/Back To Sleep Handout/Shaken Baby Prevention Handout/Breastfeeding Guide and Packet/Birth Certificate Instructions

## 2021-04-09 NOTE — PROGRESS NOTE ADULT - ASSESSMENT
A/P:  35y  PPD # 1   S/P  with 2nd degree  laceration    Doing Well    PMHx: Kidney Cyst  Current Issues:

## 2021-04-09 NOTE — DISCHARGE NOTE OB - CARE PLAN
Principal Discharge DX:	Normal vaginal delivery  Goal:	Routine postpartum care  Assessment and plan of treatment:	Please call office for 6 week postpartum visit appt  Secondary Diagnosis:	Kidney cysts  Goal:	Please f/u with nephrologist  Assessment and plan of treatment:	Please call to schedule follow up in 1 month.

## 2021-04-09 NOTE — PROGRESS NOTE ADULT - ATTENDING COMMENTS
OB attending     Patient seen and examined, agree with above assess ment  PPD1 s/p , doing well   -Discharge home   -Precautions given   -Follow-up in office in 6 weeks    -mmr offered for nonimmunity to Measles     Juli Erickson MD

## 2021-04-27 ENCOUNTER — TRANSCRIPTION ENCOUNTER (OUTPATIENT)
Age: 36
End: 2021-04-27

## 2021-08-10 NOTE — OB PROVIDER LABOR PROGRESS NOTE - NS_OBIHIFHRDETAILS_OBGYN_ALL_OB_FT
140/moderate variability/+accels/no decels
varibles persisit with mod variability
recurrent variable decels;  mod variability;
Unable to assess due to patient's cognitive impairment

## 2022-01-07 LAB — SARS-COV-2 N GENE NPH QL NAA+PROBE: NOT DETECTED

## 2022-03-01 ENCOUNTER — OUTPATIENT (OUTPATIENT)
Dept: OUTPATIENT SERVICES | Facility: HOSPITAL | Age: 37
LOS: 1 days | End: 2022-03-01
Payer: COMMERCIAL

## 2022-03-01 ENCOUNTER — APPOINTMENT (OUTPATIENT)
Dept: RADIOLOGY | Facility: CLINIC | Age: 37
End: 2022-03-01
Payer: COMMERCIAL

## 2022-03-01 DIAGNOSIS — R05.9 COUGH, UNSPECIFIED: ICD-10-CM

## 2022-03-01 PROCEDURE — 71046 X-RAY EXAM CHEST 2 VIEWS: CPT | Mod: 26

## 2022-03-01 PROCEDURE — 71046 X-RAY EXAM CHEST 2 VIEWS: CPT

## 2022-06-03 RX ORDER — AZITHROMYCIN 250 MG/1
250 TABLET, FILM COATED ORAL DAILY
Qty: 6 | Refills: 1 | Status: ACTIVE | COMMUNITY
Start: 2022-06-03 | End: 1900-01-01

## 2022-06-22 ENCOUNTER — NON-APPOINTMENT (OUTPATIENT)
Age: 37
End: 2022-06-22

## 2022-06-22 ENCOUNTER — APPOINTMENT (OUTPATIENT)
Dept: PULMONOLOGY | Facility: CLINIC | Age: 37
End: 2022-06-22
Payer: COMMERCIAL

## 2022-06-22 ENCOUNTER — LABORATORY RESULT (OUTPATIENT)
Age: 37
End: 2022-06-22

## 2022-06-22 VITALS
OXYGEN SATURATION: 96 % | SYSTOLIC BLOOD PRESSURE: 110 MMHG | HEART RATE: 66 BPM | DIASTOLIC BLOOD PRESSURE: 74 MMHG | TEMPERATURE: 98 F

## 2022-06-22 DIAGNOSIS — J20.9 ACUTE BRONCHITIS, UNSPECIFIED: ICD-10-CM

## 2022-06-22 LAB
COVID-19 NUCLEOCAPSID  GAM ANTIBODY INTERPRETATION: NEGATIVE
POCT - HEMOGLOBIN (HGB), QUANTITATIVE, TRANSCUTANEOUS: 14.3
SARS-COV-2 AB SERPL QL IA: 0.08 INDEX

## 2022-06-22 PROCEDURE — 94060 EVALUATION OF WHEEZING: CPT

## 2022-06-22 PROCEDURE — 94727 GAS DIL/WSHOT DETER LNG VOL: CPT

## 2022-06-22 PROCEDURE — 94729 DIFFUSING CAPACITY: CPT

## 2022-06-22 PROCEDURE — ZZZZZ: CPT

## 2022-06-22 PROCEDURE — 95012 NITRIC OXIDE EXP GAS DETER: CPT

## 2022-06-22 PROCEDURE — 99205 OFFICE O/P NEW HI 60 MIN: CPT | Mod: 25

## 2022-06-22 PROCEDURE — 36415 COLL VENOUS BLD VENIPUNCTURE: CPT

## 2022-06-22 PROCEDURE — 88738 HGB QUANT TRANSCUTANEOUS: CPT

## 2022-06-22 RX ORDER — FLUTICASONE FUROATE 200 UG/1
200 POWDER RESPIRATORY (INHALATION)
Qty: 1 | Refills: 3 | Status: ACTIVE | COMMUNITY
Start: 2022-06-22 | End: 1900-01-01

## 2022-06-22 RX ORDER — LEVOTHYROXINE SODIUM 0.05 MG/1
50 TABLET ORAL
Qty: 30 | Refills: 2 | Status: DISCONTINUED | COMMUNITY
Start: 2018-08-20 | End: 2022-06-22

## 2022-06-22 NOTE — REVIEW OF SYSTEMS
[Cough] : cough [Negative] : Neurologic [GERD] : no gerd [Depression] : no depression [Anxiety] : no anxiety [Panic Attacks] : no panic attacks [Diabetes] : no diabetes [TextBox_83] : Renal cyst [TextBox_144] : hypothyroid in pregnancy

## 2022-06-22 NOTE — HISTORY OF PRESENT ILLNESS
[Never] : never [TextBox_4] : 36-year-old female\par Pulmonary evaluation\par Chief complaint chronic cough\par hx several years child kendall asthma\par Patient states she developed the onset of according back to February 2022\par She does not recall a prior respiratory infection\par Does not recall any inciting event\par No history of a known COVID-19 infection\par Notes that she is COVID-19 vaccine x3 doses Pfizer\par She states in March she underwent a chest x-ray through [facility] clear lungs\par This past June she states she was treated with visit [facility]gent [facility] for diagnosis of bronchitis with interval improvement but not complete resolution of the noted cough\par She occasionally has yellow sputum\par She denies any active wheezing\par Does not report pleuritic chest pain\par No hemoptysis\par Does not state any shortness of breath dyspnea on exertion\par She feels like she has had some voice change\par Non-smoker\par No history of chemical toxic inhalational exposure\par No prior history of reported asthma pneumonia tuberculosis embolic disease interstitial lung disease\par Past medical history based on chart review\par Microcytic iron deficiency anemia management hematology has a history of a complex renal cyst

## 2022-06-22 NOTE — PHYSICAL EXAM
[No Acute Distress] : no acute distress [Normal Oropharynx] : normal oropharynx [I] : Mallampati Class: I [Normal Appearance] : normal appearance [Supple] : supple [No JVD] : no jvd [Normal Rate/Rhythm] : normal rate/rhythm [Normal S1, S2] : normal s1, s2 [No Murmurs] : no murmurs [No Rubs] : no rubs [No Gallops] : no gallops [No Resp Distress] : no resp distress [No Acc Muscle Use] : no acc muscle use [Normal Palpation] : normal palpation [Normal Rhythm and Effort] : normal rhythm and effort [Clear to Auscultation Bilaterally] : clear to auscultation bilaterally [Normal to Percussion] : normal to percussion [No Abnormalities] : no abnormalities [Benign] : benign [Normal Gait] : normal gait [No Clubbing] : no clubbing [No Cyanosis] : no cyanosis [No Edema] : no edema [FROM] : FROM [Normal Color/ Pigmentation] : normal color/ pigmentation [No Focal Deficits] : no focal deficits [Oriented x3] : oriented x3 [Normal Affect] : normal affect

## 2022-06-22 NOTE — PROCEDURE
[FreeTextEntry1] : NIOX 7 ppb WNL 6/22/22\par PFT 6/22/22\par deangelo nl\par  lung Volumes nl\par  DLCO  nl range\par  HGB 14.3 \par \par Blood Draw\par \par Chest x-ray report March 1, 2022\par Clear lung fields mild right mid thoracic scoliotic curve

## 2022-06-22 NOTE — DISCUSSION/SUMMARY
[FreeTextEntry1] : Chronic cough\par Remote history asthma\par Pulmonary physiology normal\par NIOX normal\par No symptoms of active GERD\par No symptoms of active allergies or nasal drip symptomatology\par History as noted above\par Recommendations and\par Patient was provided a short acting bronchodilator to be used only if significant chest congestion or wheeze\par Trial of inhaled corticosteroid ordered with instructions to rinse\par Discussed escalation of therapy based on review of asthma panel blood work\par In addition to consider leukotrienes inhibitor Singulair\par If no response short-term steroids treatment for reflux and upper airway symptomatology although asymptomatic to be considered\par All questions answered\par Castillo

## 2022-06-23 LAB
BASOPHILS # BLD AUTO: 0.11 K/UL
BASOPHILS NFR BLD AUTO: 1.1 %
EOSINOPHIL # BLD AUTO: 0.48 K/UL
EOSINOPHIL NFR BLD AUTO: 4.6 %
HCT VFR BLD CALC: 48.5 %
HGB BLD-MCNC: 14.5 G/DL
IMM GRANULOCYTES NFR BLD AUTO: 0.4 %
LYMPHOCYTES # BLD AUTO: 3.12 K/UL
LYMPHOCYTES NFR BLD AUTO: 30.2 %
MAN DIFF?: NORMAL
MCHC RBC-ENTMCNC: 19.4 PG
MCHC RBC-ENTMCNC: 29.9 GM/DL
MCV RBC AUTO: 64.8 FL
MONOCYTES # BLD AUTO: 0.76 K/UL
MONOCYTES NFR BLD AUTO: 7.4 %
NEUTROPHILS # BLD AUTO: 5.82 K/UL
NEUTROPHILS NFR BLD AUTO: 56.3 %
PLATELET # BLD AUTO: 370 K/UL
RBC # BLD: 7.49 M/UL
RBC # FLD: 18.5 %
WBC # FLD AUTO: 10.33 K/UL

## 2022-06-24 LAB
A ALTERNATA IGE QN: <0.1 KUA/L
A FUMIGATUS IGE QN: <0.1 KUA/L
C ALBICANS IGE QN: 0.15 KUA/L
C HERBARUM IGE QN: <0.1 KUA/L
CAT DANDER IGE QN: 0.42 KUA/L
CLAM IGE QN: <0.1 KUA/L
CODFISH IGE QN: <0.1 KUA/L
COMMON RAGWEED IGE QN: 0.18 KUA/L
CORN IGE QN: 0.68 KUA/L
COW MILK IGE QN: <0.1 KUA/L
D FARINAE IGE QN: 18.8 KUA/L
D PTERONYSS IGE QN: 19.3 KUA/L
DEPRECATED A ALTERNATA IGE RAST QL: 0
DEPRECATED A FUMIGATUS IGE RAST QL: 0
DEPRECATED C ALBICANS IGE RAST QL: NORMAL
DEPRECATED C HERBARUM IGE RAST QL: 0
DEPRECATED CAT DANDER IGE RAST QL: 1
DEPRECATED CLAM IGE RAST QL: 0
DEPRECATED CODFISH IGE RAST QL: 0
DEPRECATED COMMON RAGWEED IGE RAST QL: NORMAL
DEPRECATED CORN IGE RAST QL: 1
DEPRECATED COW MILK IGE RAST QL: 0
DEPRECATED D FARINAE IGE RAST QL: 4
DEPRECATED D PTERONYSS IGE RAST QL: 4
DEPRECATED DOG DANDER IGE RAST QL: 2
DEPRECATED EGG WHITE IGE RAST QL: NORMAL
DEPRECATED M RACEMOSUS IGE RAST QL: 0
DEPRECATED PEANUT IGE RAST QL: NORMAL
DEPRECATED ROACH IGE RAST QL: 0
DEPRECATED SCALLOP IGE RAST QL: <0.1 KUA/L
DEPRECATED SESAME SEED IGE RAST QL: NORMAL
DEPRECATED SHRIMP IGE RAST QL: NORMAL
DEPRECATED SOYBEAN IGE RAST QL: 0
DEPRECATED TIMOTHY IGE RAST QL: NORMAL
DEPRECATED WALNUT IGE RAST QL: 0
DEPRECATED WHEAT IGE RAST QL: 2
DEPRECATED WHITE OAK IGE RAST QL: NORMAL
DOG DANDER IGE QN: 1.32 KUA/L
EGG WHITE IGE QN: 0.11 KUA/L
M RACEMOSUS IGE QN: <0.1 KUA/L
PEANUT IGE QN: 0.17 KUA/L
ROACH IGE QN: <0.1 KUA/L
SCALLOP IGE QN: 0
SCALLOP IGE QN: 0.16 KUA/L
SESAME SEED IGE QN: 0.14 KUA/L
SOYBEAN IGE QN: <0.1 KUA/L
TIMOTHY IGE QN: 0.11 KUA/L
WALNUT IGE QN: <0.1 KUA/L
WHEAT IGE QN: 3.25 KUA/L
WHITE OAK IGE QN: 0.2 KUA/L

## 2022-10-06 RX ORDER — PROMETHAZINE HYDROCHLORIDE 6.25 MG/5ML
6.25 SOLUTION ORAL
Qty: 240 | Refills: 0 | Status: DISCONTINUED | COMMUNITY
Start: 2022-06-03 | End: 2022-10-06

## 2022-10-10 RX ORDER — PROMETHAZINE AND PHENYLEPHRINE HYDROCHLORIDE AND CODEINE PHOSPHATE 10; 6.25; 5 MG/5ML; MG/5ML; MG/5ML
6.25-5-1 SOLUTION ORAL
Qty: 240 | Refills: 1 | Status: DISCONTINUED | COMMUNITY
Start: 2022-10-06 | End: 2022-10-10

## 2022-10-10 RX ORDER — BENZONATATE 200 MG/1
200 CAPSULE ORAL 3 TIMES DAILY
Qty: 100 | Refills: 0 | Status: ACTIVE | COMMUNITY
Start: 2022-10-10 | End: 1900-01-01

## 2022-10-30 ENCOUNTER — NON-APPOINTMENT (OUTPATIENT)
Age: 37
End: 2022-10-30

## 2022-10-31 DIAGNOSIS — U07.1 COVID-19: ICD-10-CM

## 2022-10-31 RX ORDER — NIRMATRELVIR AND RITONAVIR 300-100 MG
20 X 150 MG & KIT ORAL
Qty: 1 | Refills: 0 | Status: ACTIVE | COMMUNITY
Start: 2022-10-31 | End: 1900-01-01

## 2022-11-24 NOTE — PATIENT PROFILE OB - TRANSPORTATION AVAILABLE, PROFILE
Monitor: The problem is stable.  Evaluation: Labs/tests ordered, see encounter summary.  Assessment/Treatment:  Continue current treatment/monitoring regimen.   car

## 2023-02-23 RX ORDER — BROMPHENIRAMINE MALEATE, PSEUDOEPHEDRINE HYDROCHLORIDE, 2; 30; 10 MG/5ML; MG/5ML; MG/5ML
30-2-10 SYRUP ORAL
Qty: 1 | Refills: 0 | Status: DISCONTINUED | COMMUNITY
Start: 2022-11-04 | End: 2023-02-23

## 2023-02-23 RX ORDER — AZITHROMYCIN 250 MG/1
250 TABLET, FILM COATED ORAL DAILY
Qty: 6 | Refills: 1 | Status: ACTIVE | COMMUNITY
Start: 2023-02-23

## 2023-02-27 RX ORDER — PROMETHAZINE HYDROCHLORIDE AND CODEINE PHOSPHATE 6.25; 1 MG/5ML; MG/5ML
6.25-1 SOLUTION ORAL
Qty: 240 | Refills: 1 | Status: DISCONTINUED | COMMUNITY
Start: 2023-02-23 | End: 2023-02-27

## 2023-02-27 RX ORDER — PROMETHAZINE HYDROCHLORIDE AND DEXTROMETHORPHAN HYDROBROMIDE ORAL SOLUTION 15; 6.25 MG/5ML; MG/5ML
6.25-15 SOLUTION ORAL
Qty: 240 | Refills: 3 | Status: ACTIVE | COMMUNITY
Start: 2023-02-27 | End: 1900-01-01

## 2023-06-26 ENCOUNTER — LABORATORY RESULT (OUTPATIENT)
Age: 38
End: 2023-06-26

## 2023-06-26 ENCOUNTER — NON-APPOINTMENT (OUTPATIENT)
Age: 38
End: 2023-06-26

## 2023-06-26 ENCOUNTER — APPOINTMENT (OUTPATIENT)
Dept: INTERNAL MEDICINE | Facility: CLINIC | Age: 38
End: 2023-06-26
Payer: COMMERCIAL

## 2023-06-26 VITALS
DIASTOLIC BLOOD PRESSURE: 87 MMHG | RESPIRATION RATE: 12 BRPM | SYSTOLIC BLOOD PRESSURE: 121 MMHG | HEART RATE: 64 BPM | HEIGHT: 62 IN | OXYGEN SATURATION: 100 % | BODY MASS INDEX: 29.63 KG/M2 | WEIGHT: 161 LBS

## 2023-06-26 DIAGNOSIS — Z91.89 OTHER SPECIFIED PERSONAL RISK FACTORS, NOT ELSEWHERE CLASSIFIED: ICD-10-CM

## 2023-06-26 DIAGNOSIS — E03.9 ENDOCRINE, NUTRITIONAL AND METABOLIC DISEASES COMPLICATING PREGNANCY, UNSPECIFIED TRIMESTER: ICD-10-CM

## 2023-06-26 DIAGNOSIS — Q61.3 POLYCYSTIC KIDNEY, UNSPECIFIED: ICD-10-CM

## 2023-06-26 DIAGNOSIS — O99.280 ENDOCRINE, NUTRITIONAL AND METABOLIC DISEASES COMPLICATING PREGNANCY, UNSPECIFIED TRIMESTER: ICD-10-CM

## 2023-06-26 DIAGNOSIS — Z80.0 FAMILY HISTORY OF MALIGNANT NEOPLASM OF DIGESTIVE ORGANS: ICD-10-CM

## 2023-06-26 DIAGNOSIS — Z11.59 ENCOUNTER FOR SCREENING FOR OTHER VIRAL DISEASES: ICD-10-CM

## 2023-06-26 DIAGNOSIS — Z00.00 ENCOUNTER FOR GENERAL ADULT MEDICAL EXAMINATION W/OUT ABNORMAL FINDINGS: ICD-10-CM

## 2023-06-26 DIAGNOSIS — D56.3 THALASSEMIA MINOR: ICD-10-CM

## 2023-06-26 PROCEDURE — 99203 OFFICE O/P NEW LOW 30 MIN: CPT | Mod: 25

## 2023-06-26 PROCEDURE — 93000 ELECTROCARDIOGRAM COMPLETE: CPT | Mod: 59

## 2023-06-26 PROCEDURE — 99385 PREV VISIT NEW AGE 18-39: CPT | Mod: 25

## 2023-06-26 PROCEDURE — 36415 COLL VENOUS BLD VENIPUNCTURE: CPT

## 2023-06-26 NOTE — ASSESSMENT
[FreeTextEntry1] : CPE\par -CBC, CMP, A1c, lipid, UA\par EKG is sinus rhythm\par Up-to-date with GYN/Pap\par Mammography at age 42\par Rectal screening at age 47 given uncle was diagnosed in his 50s\par \par 1.  History of hypothyroidism during pregnancy\par Recheck TSH and T4.  Not currently on levothyroxine.\par \par 2.  History of hematuria/proteinuria\par Check UA, albumin creatinine, protein creatinine, urine culture\par Will likely need to re-establish with Nephro\par May also need Uro if hematuria is persistent especially given family hx\par \par

## 2023-06-26 NOTE — REVIEW OF SYSTEMS
[Fever] : no fever [Chills] : no chills [Fatigue] : no fatigue [Night Sweats] : no night sweats [Vision Problems] : no vision problems [Nasal Discharge] : no nasal discharge [Chest Pain] : no chest pain [Palpitations] : no palpitations [Shortness Of Breath] : no shortness of breath [Wheezing] : no wheezing [Cough] : no cough [Abdominal Pain] : no abdominal pain [Nausea] : no nausea [Constipation] : no constipation [Diarrhea] : diarrhea [Vomiting] : no vomiting [Dysuria] : no dysuria [Hematuria] : no hematuria [Joint Pain] : no joint pain [Muscle Pain] : no muscle pain [Back Pain] : no back pain [Headache] : no headache [Dizziness] : no dizziness

## 2023-06-26 NOTE — HISTORY OF PRESENT ILLNESS
[FreeTextEntry1] : CPE [de-identified] : Jasmyn presents today as a new patient.  She has a history of thalassemia trait, hypothyroidism during pregnancy, and a history of hematuria/proteinuria in the past.  Reports she had a biopsy in her teenage years that was reported normal.  She she was being followed by nephrology for some time, though has not seen them recently.  No other new symptoms.

## 2023-06-26 NOTE — HEALTH RISK ASSESSMENT
[No] : No [0] : 2) Feeling down, depressed, or hopeless: Not at all (0) [PHQ-2 Negative - No further assessment needed] : PHQ-2 Negative - No further assessment needed [Patient reported PAP Smear was normal] : Patient reported PAP Smear was normal [Never] : Never [[id number]] : 0

## 2023-06-26 NOTE — PHYSICAL EXAM
[No Acute Distress] : no acute distress [Well Nourished] : well nourished [Well Developed] : well developed [Well-Appearing] : well-appearing [Normal Sclera/Conjunctiva] : normal sclera/conjunctiva [No Lymphadenopathy] : no lymphadenopathy [Thyroid Normal, No Nodules] : the thyroid was normal and there were no nodules present [No Respiratory Distress] : no respiratory distress  [No Accessory Muscle Use] : no accessory muscle use [Clear to Auscultation] : lungs were clear to auscultation bilaterally [Normal Rate] : normal rate  [Regular Rhythm] : with a regular rhythm [Normal S1, S2] : normal S1 and S2 [No Murmur] : no murmur heard [No Carotid Bruits] : no carotid bruits [No Abdominal Bruit] : a ~M bruit was not heard ~T in the abdomen [No Edema] : there was no peripheral edema [Declined Breast Exam] : declined breast exam  [Soft] : abdomen soft [Non Tender] : non-tender [Non-distended] : non-distended [Normal Bowel Sounds] : normal bowel sounds [Normal Supraclavicular Nodes] : no supraclavicular lymphadenopathy [Normal Axillary Nodes] : no axillary lymphadenopathy [Normal Posterior Cervical Nodes] : no posterior cervical lymphadenopathy [Normal Anterior Cervical Nodes] : no anterior cervical lymphadenopathy [No CVA Tenderness] : no CVA  tenderness [No Spinal Tenderness] : no spinal tenderness [Normal Gait] : normal gait [Alert and Oriented x3] : oriented to person, place, and time [de-identified] : Done by GYN

## 2023-07-05 ENCOUNTER — LABORATORY RESULT (OUTPATIENT)
Age: 38
End: 2023-07-05

## 2023-07-05 DIAGNOSIS — R79.89 OTHER SPECIFIED ABNORMAL FINDINGS OF BLOOD CHEMISTRY: ICD-10-CM

## 2023-07-05 DIAGNOSIS — N28.1 CYST OF KIDNEY, ACQUIRED: ICD-10-CM

## 2023-07-05 DIAGNOSIS — R80.9 PROTEINURIA, UNSPECIFIED: ICD-10-CM

## 2023-07-05 DIAGNOSIS — R31.9 HEMATURIA, UNSPECIFIED: ICD-10-CM

## 2023-07-05 LAB
ALBUMIN SERPL ELPH-MCNC: 4.1 G/DL
ALP BLD-CCNC: 70 U/L
ALT SERPL-CCNC: 16 U/L
ANION GAP SERPL CALC-SCNC: 11 MMOL/L
APPEARANCE: CLEAR
AST SERPL-CCNC: 18 U/L
BACTERIA UR CULT: NORMAL
BACTERIA: NEGATIVE /HPF
BILIRUB SERPL-MCNC: 0.6 MG/DL
BILIRUBIN URINE: NEGATIVE
BLOOD URINE: ABNORMAL
BUN SERPL-MCNC: 18 MG/DL
CALCIUM SERPL-MCNC: 9.5 MG/DL
CAST: NORMAL /LPF
CHLORIDE SERPL-SCNC: 108 MMOL/L
CHOLEST SERPL-MCNC: 177 MG/DL
CO2 SERPL-SCNC: 21 MMOL/L
COLOR: YELLOW
CREAT SERPL-MCNC: 1.36 MG/DL
CREAT SPEC-SCNC: 43 MG/DL
CREAT SPEC-SCNC: 43 MG/DL
CREAT/PROT UR: 2.6 RATIO
EGFR: 52 ML/MIN/1.73M2
EPITHELIAL CELLS: 2 /HPF
ESTIMATED AVERAGE GLUCOSE: 117 MG/DL
GLUCOSE QUALITATIVE U: NEGATIVE MG/DL
GLUCOSE SERPL-MCNC: 92 MG/DL
HBA1C MFR BLD HPLC: 5.7 %
HDLC SERPL-MCNC: 43 MG/DL
KETONES URINE: NEGATIVE MG/DL
LDLC SERPL CALC-MCNC: 106 MG/DL
LEUKOCYTE ESTERASE URINE: NEGATIVE
MICROALBUMIN 24H UR DL<=1MG/L-MCNC: 70.7 MG/DL
MICROALBUMIN/CREAT 24H UR-RTO: 1630 MG/G
MICROSCOPIC-UA: NORMAL
NITRITE URINE: NEGATIVE
NONHDLC SERPL-MCNC: 134 MG/DL
PH URINE: 6
POTASSIUM SERPL-SCNC: 4.9 MMOL/L
PROT SERPL-MCNC: 6.5 G/DL
PROT UR-MCNC: 110 MG/DL
PROTEIN URINE: 100 MG/DL
RED BLOOD CELLS URINE: 2 /HPF
REVIEW: NORMAL
SODIUM SERPL-SCNC: 141 MMOL/L
SPECIFIC GRAVITY URINE: 1.01
T4 FREE SERPL-MCNC: 1.1 NG/DL
TRIGL SERPL-MCNC: 140 MG/DL
TSH SERPL-ACNC: 2.98 UIU/ML
UROBILINOGEN URINE: 0.2 MG/DL
WHITE BLOOD CELLS URINE: 0 /HPF

## 2023-09-02 ENCOUNTER — OUTPATIENT (OUTPATIENT)
Dept: OUTPATIENT SERVICES | Facility: HOSPITAL | Age: 38
LOS: 1 days | End: 2023-09-02
Payer: COMMERCIAL

## 2023-09-02 ENCOUNTER — APPOINTMENT (OUTPATIENT)
Dept: ULTRASOUND IMAGING | Facility: CLINIC | Age: 38
End: 2023-09-02
Payer: COMMERCIAL

## 2023-09-02 DIAGNOSIS — R31.9 HEMATURIA, UNSPECIFIED: ICD-10-CM

## 2023-09-02 DIAGNOSIS — R80.9 PROTEINURIA, UNSPECIFIED: ICD-10-CM

## 2023-09-02 DIAGNOSIS — R79.89 OTHER SPECIFIED ABNORMAL FINDINGS OF BLOOD CHEMISTRY: ICD-10-CM

## 2023-09-02 DIAGNOSIS — N28.1 CYST OF KIDNEY, ACQUIRED: ICD-10-CM

## 2023-09-02 PROCEDURE — 76770 US EXAM ABDO BACK WALL COMP: CPT

## 2023-09-02 PROCEDURE — 76770 US EXAM ABDO BACK WALL COMP: CPT | Mod: 26

## 2023-09-14 LAB
ANION GAP SERPL CALC-SCNC: 11 MMOL/L
BUN SERPL-MCNC: 15 MG/DL
CALCIUM SERPL-MCNC: 9.6 MG/DL
CHLORIDE SERPL-SCNC: 104 MMOL/L
CO2 SERPL-SCNC: 26 MMOL/L
CREAT SERPL-MCNC: 1.17 MG/DL
EGFR: 62 ML/MIN/1.73M2
GLUCOSE SERPL-MCNC: 89 MG/DL
POTASSIUM SERPL-SCNC: 4.6 MMOL/L
SODIUM SERPL-SCNC: 141 MMOL/L

## 2023-10-02 DIAGNOSIS — Z23 ENCOUNTER FOR IMMUNIZATION: ICD-10-CM

## 2023-11-14 ENCOUNTER — EMERGENCY (EMERGENCY)
Facility: HOSPITAL | Age: 38
LOS: 1 days | Discharge: HOME | End: 2023-11-14
Attending: EMERGENCY MEDICINE | Admitting: EMERGENCY MEDICINE
Payer: COMMERCIAL

## 2023-11-14 VITALS
DIASTOLIC BLOOD PRESSURE: 68 MMHG | HEART RATE: 89 BPM | OXYGEN SATURATION: 98 % | HEIGHT: 62 IN | WEIGHT: 160.06 LBS | RESPIRATION RATE: 18 BRPM | TEMPERATURE: 97 F | SYSTOLIC BLOOD PRESSURE: 115 MMHG

## 2023-11-14 VITALS
OXYGEN SATURATION: 96 % | DIASTOLIC BLOOD PRESSURE: 60 MMHG | SYSTOLIC BLOOD PRESSURE: 110 MMHG | TEMPERATURE: 98 F | RESPIRATION RATE: 18 BRPM | HEART RATE: 87 BPM

## 2023-11-14 LAB
ALBUMIN SERPL ELPH-MCNC: 3.3 G/DL — SIGNIFICANT CHANGE UP (ref 3.3–5)
ALBUMIN SERPL ELPH-MCNC: 3.3 G/DL — SIGNIFICANT CHANGE UP (ref 3.3–5)
ALP SERPL-CCNC: 83 U/L — SIGNIFICANT CHANGE UP (ref 40–120)
ALP SERPL-CCNC: 83 U/L — SIGNIFICANT CHANGE UP (ref 40–120)
ALT FLD-CCNC: 41 U/L — SIGNIFICANT CHANGE UP (ref 12–78)
ALT FLD-CCNC: 41 U/L — SIGNIFICANT CHANGE UP (ref 12–78)
ANION GAP SERPL CALC-SCNC: 6 MMOL/L — SIGNIFICANT CHANGE UP (ref 5–17)
ANION GAP SERPL CALC-SCNC: 6 MMOL/L — SIGNIFICANT CHANGE UP (ref 5–17)
ANISOCYTOSIS BLD QL: SLIGHT — SIGNIFICANT CHANGE UP
ANISOCYTOSIS BLD QL: SLIGHT — SIGNIFICANT CHANGE UP
APPEARANCE UR: ABNORMAL
APPEARANCE UR: ABNORMAL
AST SERPL-CCNC: 27 U/L — SIGNIFICANT CHANGE UP (ref 15–37)
AST SERPL-CCNC: 27 U/L — SIGNIFICANT CHANGE UP (ref 15–37)
BACTERIA # UR AUTO: ABNORMAL /HPF
BACTERIA # UR AUTO: ABNORMAL /HPF
BASOPHILS # BLD AUTO: 0.04 K/UL — SIGNIFICANT CHANGE UP (ref 0–0.2)
BASOPHILS # BLD AUTO: 0.04 K/UL — SIGNIFICANT CHANGE UP (ref 0–0.2)
BASOPHILS NFR BLD AUTO: 0.5 % — SIGNIFICANT CHANGE UP (ref 0–2)
BASOPHILS NFR BLD AUTO: 0.5 % — SIGNIFICANT CHANGE UP (ref 0–2)
BILIRUB SERPL-MCNC: 0.6 MG/DL — SIGNIFICANT CHANGE UP (ref 0.2–1.2)
BILIRUB SERPL-MCNC: 0.6 MG/DL — SIGNIFICANT CHANGE UP (ref 0.2–1.2)
BILIRUB UR-MCNC: NEGATIVE — SIGNIFICANT CHANGE UP
BILIRUB UR-MCNC: NEGATIVE — SIGNIFICANT CHANGE UP
BUN SERPL-MCNC: 18 MG/DL — SIGNIFICANT CHANGE UP (ref 7–23)
BUN SERPL-MCNC: 18 MG/DL — SIGNIFICANT CHANGE UP (ref 7–23)
CALCIUM SERPL-MCNC: 8.8 MG/DL — SIGNIFICANT CHANGE UP (ref 8.5–10.1)
CALCIUM SERPL-MCNC: 8.8 MG/DL — SIGNIFICANT CHANGE UP (ref 8.5–10.1)
CHLORIDE SERPL-SCNC: 111 MMOL/L — HIGH (ref 96–108)
CHLORIDE SERPL-SCNC: 111 MMOL/L — HIGH (ref 96–108)
CO2 SERPL-SCNC: 26 MMOL/L — SIGNIFICANT CHANGE UP (ref 22–31)
CO2 SERPL-SCNC: 26 MMOL/L — SIGNIFICANT CHANGE UP (ref 22–31)
COLOR SPEC: SIGNIFICANT CHANGE UP
COLOR SPEC: SIGNIFICANT CHANGE UP
CREAT SERPL-MCNC: 1.4 MG/DL — HIGH (ref 0.5–1.3)
CREAT SERPL-MCNC: 1.4 MG/DL — HIGH (ref 0.5–1.3)
DACRYOCYTES BLD QL SMEAR: SLIGHT — SIGNIFICANT CHANGE UP
DACRYOCYTES BLD QL SMEAR: SLIGHT — SIGNIFICANT CHANGE UP
DIFF PNL FLD: ABNORMAL
DIFF PNL FLD: ABNORMAL
EGFR: 50 ML/MIN/1.73M2 — LOW
EGFR: 50 ML/MIN/1.73M2 — LOW
EOSINOPHIL # BLD AUTO: 0.36 K/UL — SIGNIFICANT CHANGE UP (ref 0–0.5)
EOSINOPHIL # BLD AUTO: 0.36 K/UL — SIGNIFICANT CHANGE UP (ref 0–0.5)
EOSINOPHIL NFR BLD AUTO: 4.8 % — SIGNIFICANT CHANGE UP (ref 0–6)
EOSINOPHIL NFR BLD AUTO: 4.8 % — SIGNIFICANT CHANGE UP (ref 0–6)
EPI CELLS # UR: PRESENT
EPI CELLS # UR: PRESENT
FLUAV AG NPH QL: SIGNIFICANT CHANGE UP
FLUAV AG NPH QL: SIGNIFICANT CHANGE UP
FLUBV AG NPH QL: SIGNIFICANT CHANGE UP
FLUBV AG NPH QL: SIGNIFICANT CHANGE UP
GLUCOSE SERPL-MCNC: 109 MG/DL — HIGH (ref 70–99)
GLUCOSE SERPL-MCNC: 109 MG/DL — HIGH (ref 70–99)
GLUCOSE UR QL: NEGATIVE MG/DL — SIGNIFICANT CHANGE UP
GLUCOSE UR QL: NEGATIVE MG/DL — SIGNIFICANT CHANGE UP
HCG SERPL-ACNC: <1 MIU/ML — SIGNIFICANT CHANGE UP
HCG SERPL-ACNC: <1 MIU/ML — SIGNIFICANT CHANGE UP
HCT VFR BLD CALC: 47.8 % — HIGH (ref 34.5–45)
HCT VFR BLD CALC: 47.8 % — HIGH (ref 34.5–45)
HGB BLD-MCNC: 14.5 G/DL — SIGNIFICANT CHANGE UP (ref 11.5–15.5)
HGB BLD-MCNC: 14.5 G/DL — SIGNIFICANT CHANGE UP (ref 11.5–15.5)
HYALINE CASTS # UR AUTO: PRESENT
HYALINE CASTS # UR AUTO: PRESENT
HYPOCHROMIA BLD QL: SLIGHT — SIGNIFICANT CHANGE UP
HYPOCHROMIA BLD QL: SLIGHT — SIGNIFICANT CHANGE UP
IMM GRANULOCYTES NFR BLD AUTO: 0.8 % — SIGNIFICANT CHANGE UP (ref 0–0.9)
IMM GRANULOCYTES NFR BLD AUTO: 0.8 % — SIGNIFICANT CHANGE UP (ref 0–0.9)
KETONES UR-MCNC: NEGATIVE MG/DL — SIGNIFICANT CHANGE UP
KETONES UR-MCNC: NEGATIVE MG/DL — SIGNIFICANT CHANGE UP
LEUKOCYTE ESTERASE UR-ACNC: ABNORMAL
LEUKOCYTE ESTERASE UR-ACNC: ABNORMAL
LIDOCAIN IGE QN: 29 U/L — SIGNIFICANT CHANGE UP (ref 13–75)
LIDOCAIN IGE QN: 29 U/L — SIGNIFICANT CHANGE UP (ref 13–75)
LYMPHOCYTES # BLD AUTO: 1.85 K/UL — SIGNIFICANT CHANGE UP (ref 1–3.3)
LYMPHOCYTES # BLD AUTO: 1.85 K/UL — SIGNIFICANT CHANGE UP (ref 1–3.3)
LYMPHOCYTES # BLD AUTO: 24.7 % — SIGNIFICANT CHANGE UP (ref 13–44)
LYMPHOCYTES # BLD AUTO: 24.7 % — SIGNIFICANT CHANGE UP (ref 13–44)
MCHC RBC-ENTMCNC: 19.1 PG — LOW (ref 27–34)
MCHC RBC-ENTMCNC: 19.1 PG — LOW (ref 27–34)
MCHC RBC-ENTMCNC: 30.3 GM/DL — LOW (ref 32–36)
MCHC RBC-ENTMCNC: 30.3 GM/DL — LOW (ref 32–36)
MCV RBC AUTO: 63 FL — LOW (ref 80–100)
MCV RBC AUTO: 63 FL — LOW (ref 80–100)
MICROCYTES BLD QL: SLIGHT — SIGNIFICANT CHANGE UP
MICROCYTES BLD QL: SLIGHT — SIGNIFICANT CHANGE UP
MONOCYTES # BLD AUTO: 0.86 K/UL — SIGNIFICANT CHANGE UP (ref 0–0.9)
MONOCYTES # BLD AUTO: 0.86 K/UL — SIGNIFICANT CHANGE UP (ref 0–0.9)
MONOCYTES NFR BLD AUTO: 11.5 % — SIGNIFICANT CHANGE UP (ref 2–14)
MONOCYTES NFR BLD AUTO: 11.5 % — SIGNIFICANT CHANGE UP (ref 2–14)
NEUTROPHILS # BLD AUTO: 4.33 K/UL — SIGNIFICANT CHANGE UP (ref 1.8–7.4)
NEUTROPHILS # BLD AUTO: 4.33 K/UL — SIGNIFICANT CHANGE UP (ref 1.8–7.4)
NEUTROPHILS NFR BLD AUTO: 57.7 % — SIGNIFICANT CHANGE UP (ref 43–77)
NEUTROPHILS NFR BLD AUTO: 57.7 % — SIGNIFICANT CHANGE UP (ref 43–77)
NITRITE UR-MCNC: NEGATIVE — SIGNIFICANT CHANGE UP
NITRITE UR-MCNC: NEGATIVE — SIGNIFICANT CHANGE UP
NRBC # BLD: 0 /100 WBCS — SIGNIFICANT CHANGE UP (ref 0–0)
NRBC # BLD: 0 /100 WBCS — SIGNIFICANT CHANGE UP (ref 0–0)
PH UR: 5.5 — SIGNIFICANT CHANGE UP (ref 5–8)
PH UR: 5.5 — SIGNIFICANT CHANGE UP (ref 5–8)
PLAT MORPH BLD: NORMAL — SIGNIFICANT CHANGE UP
PLAT MORPH BLD: NORMAL — SIGNIFICANT CHANGE UP
PLATELET # BLD AUTO: 246 K/UL — SIGNIFICANT CHANGE UP (ref 150–400)
PLATELET # BLD AUTO: 246 K/UL — SIGNIFICANT CHANGE UP (ref 150–400)
POIKILOCYTOSIS BLD QL AUTO: SLIGHT — SIGNIFICANT CHANGE UP
POIKILOCYTOSIS BLD QL AUTO: SLIGHT — SIGNIFICANT CHANGE UP
POLYCHROMASIA BLD QL SMEAR: SLIGHT — SIGNIFICANT CHANGE UP
POLYCHROMASIA BLD QL SMEAR: SLIGHT — SIGNIFICANT CHANGE UP
POTASSIUM SERPL-MCNC: 4.3 MMOL/L — SIGNIFICANT CHANGE UP (ref 3.5–5.3)
POTASSIUM SERPL-MCNC: 4.3 MMOL/L — SIGNIFICANT CHANGE UP (ref 3.5–5.3)
POTASSIUM SERPL-SCNC: 4.3 MMOL/L — SIGNIFICANT CHANGE UP (ref 3.5–5.3)
POTASSIUM SERPL-SCNC: 4.3 MMOL/L — SIGNIFICANT CHANGE UP (ref 3.5–5.3)
PROT SERPL-MCNC: 7.7 G/DL — SIGNIFICANT CHANGE UP (ref 6–8.3)
PROT SERPL-MCNC: 7.7 G/DL — SIGNIFICANT CHANGE UP (ref 6–8.3)
PROT UR-MCNC: >=1000 MG/DL
PROT UR-MCNC: >=1000 MG/DL
RBC # BLD: 7.59 M/UL — HIGH (ref 3.8–5.2)
RBC # BLD: 7.59 M/UL — HIGH (ref 3.8–5.2)
RBC # FLD: 18.7 % — HIGH (ref 10.3–14.5)
RBC # FLD: 18.7 % — HIGH (ref 10.3–14.5)
RBC BLD AUTO: ABNORMAL
RBC BLD AUTO: ABNORMAL
RBC CASTS # UR COMP ASSIST: 8 /HPF — HIGH (ref 0–4)
RBC CASTS # UR COMP ASSIST: 8 /HPF — HIGH (ref 0–4)
RSV RNA NPH QL NAA+NON-PROBE: SIGNIFICANT CHANGE UP
RSV RNA NPH QL NAA+NON-PROBE: SIGNIFICANT CHANGE UP
SARS-COV-2 RNA SPEC QL NAA+PROBE: SIGNIFICANT CHANGE UP
SARS-COV-2 RNA SPEC QL NAA+PROBE: SIGNIFICANT CHANGE UP
SODIUM SERPL-SCNC: 143 MMOL/L — SIGNIFICANT CHANGE UP (ref 135–145)
SODIUM SERPL-SCNC: 143 MMOL/L — SIGNIFICANT CHANGE UP (ref 135–145)
SP GR SPEC: 1.02 — SIGNIFICANT CHANGE UP (ref 1–1.03)
SP GR SPEC: 1.02 — SIGNIFICANT CHANGE UP (ref 1–1.03)
STOMATOCYTES BLD QL SMEAR: SLIGHT — SIGNIFICANT CHANGE UP
STOMATOCYTES BLD QL SMEAR: SLIGHT — SIGNIFICANT CHANGE UP
UROBILINOGEN FLD QL: 1 MG/DL — SIGNIFICANT CHANGE UP (ref 0.2–1)
UROBILINOGEN FLD QL: 1 MG/DL — SIGNIFICANT CHANGE UP (ref 0.2–1)
WBC # BLD: 7.5 K/UL — SIGNIFICANT CHANGE UP (ref 3.8–10.5)
WBC # BLD: 7.5 K/UL — SIGNIFICANT CHANGE UP (ref 3.8–10.5)
WBC # FLD AUTO: 7.5 K/UL — SIGNIFICANT CHANGE UP (ref 3.8–10.5)
WBC # FLD AUTO: 7.5 K/UL — SIGNIFICANT CHANGE UP (ref 3.8–10.5)
WBC UR QL: 2 /HPF — SIGNIFICANT CHANGE UP (ref 0–5)
WBC UR QL: 2 /HPF — SIGNIFICANT CHANGE UP (ref 0–5)

## 2023-11-14 PROCEDURE — 99285 EMERGENCY DEPT VISIT HI MDM: CPT

## 2023-11-14 PROCEDURE — 76830 TRANSVAGINAL US NON-OB: CPT | Mod: 26

## 2023-11-14 PROCEDURE — 80053 COMPREHEN METABOLIC PANEL: CPT

## 2023-11-14 PROCEDURE — 96375 TX/PRO/DX INJ NEW DRUG ADDON: CPT

## 2023-11-14 PROCEDURE — 99284 EMERGENCY DEPT VISIT MOD MDM: CPT | Mod: 25

## 2023-11-14 PROCEDURE — 96374 THER/PROPH/DIAG INJ IV PUSH: CPT

## 2023-11-14 PROCEDURE — 87637 SARSCOV2&INF A&B&RSV AMP PRB: CPT

## 2023-11-14 PROCEDURE — 76830 TRANSVAGINAL US NON-OB: CPT

## 2023-11-14 PROCEDURE — 87086 URINE CULTURE/COLONY COUNT: CPT

## 2023-11-14 PROCEDURE — G1004: CPT

## 2023-11-14 PROCEDURE — 71046 X-RAY EXAM CHEST 2 VIEWS: CPT

## 2023-11-14 PROCEDURE — 36415 COLL VENOUS BLD VENIPUNCTURE: CPT

## 2023-11-14 PROCEDURE — 71046 X-RAY EXAM CHEST 2 VIEWS: CPT | Mod: 26

## 2023-11-14 PROCEDURE — 85025 COMPLETE CBC W/AUTO DIFF WBC: CPT

## 2023-11-14 PROCEDURE — 83690 ASSAY OF LIPASE: CPT

## 2023-11-14 PROCEDURE — 84702 CHORIONIC GONADOTROPIN TEST: CPT

## 2023-11-14 PROCEDURE — 74177 CT ABD & PELVIS W/CONTRAST: CPT | Mod: ME

## 2023-11-14 PROCEDURE — 74177 CT ABD & PELVIS W/CONTRAST: CPT | Mod: 26,ME

## 2023-11-14 PROCEDURE — 81001 URINALYSIS AUTO W/SCOPE: CPT

## 2023-11-14 RX ORDER — SODIUM CHLORIDE 9 MG/ML
1000 INJECTION INTRAMUSCULAR; INTRAVENOUS; SUBCUTANEOUS ONCE
Refills: 0 | Status: COMPLETED | OUTPATIENT
Start: 2023-11-14 | End: 2023-11-14

## 2023-11-14 RX ORDER — ACETAMINOPHEN 500 MG
1000 TABLET ORAL ONCE
Refills: 0 | Status: COMPLETED | OUTPATIENT
Start: 2023-11-14 | End: 2023-11-14

## 2023-11-14 RX ORDER — ONDANSETRON 8 MG/1
4 TABLET, FILM COATED ORAL ONCE
Refills: 0 | Status: COMPLETED | OUTPATIENT
Start: 2023-11-14 | End: 2023-11-14

## 2023-11-14 RX ADMIN — SODIUM CHLORIDE 1000 MILLILITER(S): 9 INJECTION INTRAMUSCULAR; INTRAVENOUS; SUBCUTANEOUS at 10:37

## 2023-11-14 RX ADMIN — ONDANSETRON 4 MILLIGRAM(S): 8 TABLET, FILM COATED ORAL at 10:37

## 2023-11-14 RX ADMIN — Medication 400 MILLIGRAM(S): at 10:37

## 2023-11-14 NOTE — ED PROVIDER NOTE - DIFFERENTIAL DIAGNOSIS
Differential including but not limited to ovarian cyst or torsion UTI pyelonephritis appendicitis enteritis colitis diverticulitis Differential Diagnosis

## 2023-11-14 NOTE — ED ADULT NURSE NOTE - CINV DISCH MEDS REVIEWED YN
no dermatitis, no environmental allergies, no food allergies, no immunosuppressive disorder, and no pruritus. Yes

## 2023-11-14 NOTE — ED PROVIDER NOTE - CARE PROVIDER_API CALL
Martha Atwood  Internal Medicine  [address] [place] [id number]  Phone: [phone]  Fax: [phone]  Follow Up Time: 1-3 Days

## 2023-11-14 NOTE — ED PROVIDER NOTE - PATIENT PORTAL LINK FT
You can access the FollowMyHealth Patient Portal offered by [facility] by registering at the following website: [email or web address]. By joining [employer]’s FollowMyHealth portal, you will also be able to view your health information using other applications (apps) compatible with our system.

## 2023-11-14 NOTE — ED PROVIDER NOTE - OBJECTIVE STATEMENT
Patient complaining of abdominal pain which began which began periumbilical region 2 nights ago and has since radiated to the right lower quadrant.  Patient reports had a temperature of 101 on first day.  Patient reports nonproductive cough and some nausea.  No headache chest pain shortness of breath vomiting diarrhea dysuria hematuria frequency.  ARLIN Atwood

## 2023-11-14 NOTE — ED ADULT NURSE NOTE - OBJECTIVE STATEMENT
a/ox4 patient came to ED c.o lower right abd pain that began Sunday. Pateint states she had fever the first night but doesn't have one anymore. Denies diarrhea, vomiting but does endorse nausea. Pending further labs and radiology reports. Will cont to monitor.

## 2023-11-14 NOTE — ED ADULT NURSE NOTE - NSFALLHARMRISKINTERV_ED_ALL_ED
Assistance OOB with selected safe patient handling equipment if applicable/Assistance with ambulation/Communicate risk of Fall with Harm to all staff, patient, and family/Monitor gait and stability/Monitor for mental status changes and reorient to person, place, and time, as needed/Move patient closer to nursing station/within visual sight of ED staff/Provide patient with walking aids/Provide visual cue: red socks, yellow wristband, yellow gown, etc/Reinforce activity limits and safety measures with patient and family/Toileting schedule using arm’s reach rule for commode and bathroom/Use of alarms - bed, stretcher, chair and/or video monitoring/Bed in lowest position, wheels locked, appropriate side rails in place/Call bell, personal items and telephone in reach/Instruct patient to call for assistance before getting out of bed/chair/stretcher/Non-slip footwear applied when patient is off stretcher/Vida to call system/Physically safe environment - no spills, clutter or unnecessary equipment/Purposeful Proactive Rounding/Room/bathroom lighting operational, light cord in reach

## 2023-11-14 NOTE — ED PROVIDER NOTE - GENITOURINARY NEGATIVE STATEMENT, MLM
Pt returned call she was informed of the message below to continue on Omeprazole.  Follow up visit appt was made.   no dysuria, no frequency, and no hematuria.

## 2023-11-14 NOTE — ED PROVIDER NOTE - CLINICAL SUMMARY MEDICAL DECISION MAKING FREE TEXT BOX
Patient complaining of abdominal pain which began which began periumbilical region 2 nights ago and has since radiated to the right lower quadrant.  Patient reports had a temperature of 101 on first day.  Patient reports nonproductive cough and some nausea.  No headache chest pain shortness of breath vomiting diarrhea dysuria hematuria frequency.  PMD Lainiotis    Plan Labs chest x-ray CT abdomen pelvis pelvic ultrasound IV fluid Zofran Ofirmev    Differential including but not limited to ovarian cyst or torsion UTI pyelonephritis appendicitis enteritis colitis diverticulitis

## 2023-11-14 NOTE — ED ADULT TRIAGE NOTE - CHIEF COMPLAINT QUOTE
Pt states that she developed pain in the middle of her ABD that radiates to her RLQ with fever, chills, and nausea. Pt denies V, D,

## 2023-11-15 LAB
CULTURE RESULTS: SIGNIFICANT CHANGE UP
CULTURE RESULTS: SIGNIFICANT CHANGE UP
SPECIMEN SOURCE: SIGNIFICANT CHANGE UP
SPECIMEN SOURCE: SIGNIFICANT CHANGE UP

## 2024-02-05 ENCOUNTER — NON-APPOINTMENT (OUTPATIENT)
Age: 39
End: 2024-02-05

## 2024-06-20 NOTE — PRE-ANESTHESIA EVALUATION ADULT - NS MD HP INPLANTS MED DEV
-swish and spit after all meals     Take Augmentin twice a day as prescribed, for 10 days    Follow up with the oral maxillofacial surgeon as soon as possible    Return for fever, chills, uncontrolled bleeding, or any other new/concerning symptoms    None

## 2024-06-25 RX ORDER — NIFEDIPINE 30 MG/1
30 TABLET, EXTENDED RELEASE ORAL DAILY
Qty: 30 | Refills: 1 | Status: ACTIVE | COMMUNITY
Start: 2024-06-25 | End: 1900-01-01

## 2024-08-26 ENCOUNTER — RX RENEWAL (OUTPATIENT)
Age: 39
End: 2024-08-26

## 2024-11-07 RX ORDER — AZITHROMYCIN 250 MG/1
250 TABLET, FILM COATED ORAL
Qty: 1 | Refills: 1 | Status: ACTIVE | COMMUNITY
Start: 2024-11-07 | End: 1900-01-01

## 2025-09-02 ENCOUNTER — RX RENEWAL (OUTPATIENT)
Age: 40
End: 2025-09-02